# Patient Record
Sex: FEMALE | NOT HISPANIC OR LATINO
[De-identification: names, ages, dates, MRNs, and addresses within clinical notes are randomized per-mention and may not be internally consistent; named-entity substitution may affect disease eponyms.]

---

## 2021-09-16 ENCOUNTER — RESULT REVIEW (OUTPATIENT)
Age: 81
End: 2021-09-16

## 2021-12-21 ENCOUNTER — RESULT REVIEW (OUTPATIENT)
Age: 81
End: 2021-12-21

## 2022-01-10 ENCOUNTER — APPOINTMENT (OUTPATIENT)
Dept: GYNECOLOGIC ONCOLOGY | Facility: CLINIC | Age: 82
End: 2022-01-10
Payer: MEDICARE

## 2022-01-10 VITALS
BODY MASS INDEX: 43.61 KG/M2 | HEIGHT: 61 IN | DIASTOLIC BLOOD PRESSURE: 90 MMHG | SYSTOLIC BLOOD PRESSURE: 151 MMHG | HEART RATE: 76 BPM | WEIGHT: 231 LBS

## 2022-01-10 DIAGNOSIS — Z63.4 DISAPPEARANCE AND DEATH OF FAMILY MEMBER: ICD-10-CM

## 2022-01-10 DIAGNOSIS — Z78.9 OTHER SPECIFIED HEALTH STATUS: ICD-10-CM

## 2022-01-10 DIAGNOSIS — Z95.2 PRESENCE OF PROSTHETIC HEART VALVE: ICD-10-CM

## 2022-01-10 PROCEDURE — 99205 OFFICE O/P NEW HI 60 MIN: CPT

## 2022-01-10 RX ORDER — OMEPRAZOLE 40 MG/1
CAPSULE, DELAYED RELEASE ORAL
Refills: 0 | Status: ACTIVE | COMMUNITY

## 2022-01-10 RX ORDER — CHROMIUM 200 MCG
TABLET ORAL
Refills: 0 | Status: ACTIVE | COMMUNITY

## 2022-01-10 RX ORDER — ASPIRIN 81 MG
81 TABLET, DELAYED RELEASE (ENTERIC COATED) ORAL
Refills: 0 | Status: ACTIVE | COMMUNITY

## 2022-01-10 SDOH — SOCIAL STABILITY - SOCIAL INSECURITY: DISSAPEARANCE AND DEATH OF FAMILY MEMBER: Z63.4

## 2022-01-31 ENCOUNTER — OUTPATIENT (OUTPATIENT)
Dept: OUTPATIENT SERVICES | Facility: HOSPITAL | Age: 82
LOS: 1 days | End: 2022-01-31
Payer: MEDICARE

## 2022-01-31 VITALS
HEIGHT: 60.5 IN | WEIGHT: 231.93 LBS | TEMPERATURE: 98 F | HEART RATE: 80 BPM | OXYGEN SATURATION: 98 % | RESPIRATION RATE: 18 BRPM | DIASTOLIC BLOOD PRESSURE: 80 MMHG | SYSTOLIC BLOOD PRESSURE: 140 MMHG

## 2022-01-31 DIAGNOSIS — Z88.8 ALLERGY STATUS TO OTHER DRUGS, MEDICAMENTS AND BIOLOGICAL SUBSTANCES STATUS: ICD-10-CM

## 2022-01-31 DIAGNOSIS — Z91.040 LATEX ALLERGY STATUS: ICD-10-CM

## 2022-01-31 DIAGNOSIS — Z95.0 PRESENCE OF CARDIAC PACEMAKER: Chronic | ICD-10-CM

## 2022-01-31 DIAGNOSIS — C54.1 MALIGNANT NEOPLASM OF ENDOMETRIUM: ICD-10-CM

## 2022-01-31 DIAGNOSIS — Z95.0 PRESENCE OF CARDIAC PACEMAKER: ICD-10-CM

## 2022-01-31 DIAGNOSIS — Z95.818 PRESENCE OF OTHER CARDIAC IMPLANTS AND GRAFTS: Chronic | ICD-10-CM

## 2022-01-31 DIAGNOSIS — Z95.2 PRESENCE OF PROSTHETIC HEART VALVE: Chronic | ICD-10-CM

## 2022-01-31 DIAGNOSIS — Z90.49 ACQUIRED ABSENCE OF OTHER SPECIFIED PARTS OF DIGESTIVE TRACT: Chronic | ICD-10-CM

## 2022-01-31 LAB
A1C WITH ESTIMATED AVERAGE GLUCOSE RESULT: 8 % — HIGH (ref 4–5.6)
ALBUMIN SERPL ELPH-MCNC: 4.2 G/DL — SIGNIFICANT CHANGE UP (ref 3.3–5)
ALP SERPL-CCNC: 113 U/L — SIGNIFICANT CHANGE UP (ref 40–120)
ALT FLD-CCNC: 26 U/L — SIGNIFICANT CHANGE UP (ref 4–33)
ANION GAP SERPL CALC-SCNC: 12 MMOL/L — SIGNIFICANT CHANGE UP (ref 7–14)
AST SERPL-CCNC: 23 U/L — SIGNIFICANT CHANGE UP (ref 4–32)
BILIRUB SERPL-MCNC: 0.2 MG/DL — SIGNIFICANT CHANGE UP (ref 0.2–1.2)
BLD GP AB SCN SERPL QL: NEGATIVE — SIGNIFICANT CHANGE UP
BUN SERPL-MCNC: 20 MG/DL — SIGNIFICANT CHANGE UP (ref 7–23)
CALCIUM SERPL-MCNC: 10 MG/DL — SIGNIFICANT CHANGE UP (ref 8.4–10.5)
CHLORIDE SERPL-SCNC: 92 MMOL/L — LOW (ref 98–107)
CO2 SERPL-SCNC: 28 MMOL/L — SIGNIFICANT CHANGE UP (ref 22–31)
CREAT SERPL-MCNC: 0.6 MG/DL — SIGNIFICANT CHANGE UP (ref 0.5–1.3)
ESTIMATED AVERAGE GLUCOSE: 183 — SIGNIFICANT CHANGE UP
GLUCOSE SERPL-MCNC: 240 MG/DL — HIGH (ref 70–99)
HCT VFR BLD CALC: 45.2 % — HIGH (ref 34.5–45)
HGB BLD-MCNC: 14.9 G/DL — SIGNIFICANT CHANGE UP (ref 11.5–15.5)
MCHC RBC-ENTMCNC: 27.7 PG — SIGNIFICANT CHANGE UP (ref 27–34)
MCHC RBC-ENTMCNC: 33 GM/DL — SIGNIFICANT CHANGE UP (ref 32–36)
MCV RBC AUTO: 84.2 FL — SIGNIFICANT CHANGE UP (ref 80–100)
NRBC # BLD: 0 /100 WBCS — SIGNIFICANT CHANGE UP
NRBC # FLD: 0 K/UL — SIGNIFICANT CHANGE UP
PLATELET # BLD AUTO: 373 K/UL — SIGNIFICANT CHANGE UP (ref 150–400)
POTASSIUM SERPL-MCNC: 4.5 MMOL/L — SIGNIFICANT CHANGE UP (ref 3.5–5.3)
POTASSIUM SERPL-SCNC: 4.5 MMOL/L — SIGNIFICANT CHANGE UP (ref 3.5–5.3)
PROT SERPL-MCNC: 8 G/DL — SIGNIFICANT CHANGE UP (ref 6–8.3)
RBC # BLD: 5.37 M/UL — HIGH (ref 3.8–5.2)
RBC # FLD: 14 % — SIGNIFICANT CHANGE UP (ref 10.3–14.5)
RH IG SCN BLD-IMP: POSITIVE — SIGNIFICANT CHANGE UP
SODIUM SERPL-SCNC: 132 MMOL/L — LOW (ref 135–145)
WBC # BLD: 10.06 K/UL — SIGNIFICANT CHANGE UP (ref 3.8–10.5)
WBC # FLD AUTO: 10.06 K/UL — SIGNIFICANT CHANGE UP (ref 3.8–10.5)

## 2022-01-31 PROCEDURE — 93010 ELECTROCARDIOGRAM REPORT: CPT

## 2022-01-31 NOTE — H&P PST ADULT - NEGATIVE ENMT SYMPTOMS
no hearing difficulty/no ear pain/no tinnitus/no sinus symptoms/no nasal congestion/no nasal obstruction/no nose bleeds/no throat pain/no dysphagia

## 2022-01-31 NOTE — H&P PST ADULT - PROBLEM SELECTOR PLAN 1
Schedule for robotic assisted total laparoscopic hysterectomy bilateral salpingo oophorectomy sentinel lymph node mapping tentatively on 02/10/2022. Pre op instructions, famotidine, chlorhexidine gluconate soap given and explained.   Pt confirmed schedule appt for Covid test pre op

## 2022-01-31 NOTE — H&P PST ADULT - NSANTHOSAYNRD_GEN_A_CORE
No. EVRNA screening performed.  STOP BANG Legend: 0-2 = LOW Risk; 3-4 = INTERMEDIATE Risk; 5-8 = HIGH Risk

## 2022-01-31 NOTE — H&P PST ADULT - NEGATIVE GASTROINTESTINAL SYMPTOMS
----- Message from [address] [place] [address], HALEY - CNP sent at 10/26/2021  8:55 AM EDT -----  Results are excellent, continue all current medications.
Message left normal labs.
no vomiting/no diarrhea/no constipation/no change in bowel habits/no abdominal pain/no melena/no steatorrhea/no jaundice

## 2022-01-31 NOTE — H&P PST ADULT - BP NONINVASIVE MEAN (MM HG)
alcohol intox, no signs of trauma, +vomited prior to ED arrival, will order zofran IM, not hypoglycemia, will reassess.
100

## 2022-01-31 NOTE — H&P PST ADULT - NSICDXPASTMEDICALHX_GEN_ALL_CORE_FT
PAST MEDICAL HISTORY:  CAD (coronary atherosclerotic disease)     Congestive heart failure (CHF)     HTN (hypertension)     Obesity

## 2022-01-31 NOTE — H&P PST ADULT - NSICDXPASTSURGICALHX_GEN_ALL_CORE_FT
PAST SURGICAL HISTORY:  History of appendectomy at age 15    S/P TAVR (transcatheter aortic valve replacement) 04/2021    Status cardiac pacemaker 01/12/2022    Status post placement of implantable loop recorder removal  01/2022

## 2022-01-31 NOTE — H&P PST ADULT - NS MD HP INPLANTS MED DEV
Pacemaker/Heart valve Medtronic Pacemaker - dual chamber [id number], [id number]; placed 01/12/2022/Pacemaker/Heart valve

## 2022-01-31 NOTE — H&P PST ADULT - MUSCULOSKELETAL
details… no joint swelling/no joint warmth/no calf tenderness/decreased ROM due to pain detailed exam

## 2022-01-31 NOTE — H&P PST ADULT - HISTORY OF PRESENT ILLNESS
82 y/o female with H/O: CAD, HTN, ovarian cyst  c/o groin discomfort and vaginal itching 11/2021. Pt was eval by GYN, S/P hysterogram. S/P biopsy of endometrium showed "Stage 1 cancer". Pre op diagnosis: malignant neoplasm of endometrium. Now schedule for robotic assisted total laparoscopic hysterectomy, BSO    ***  + Covid infection 12/25/2021 *** 80 y/o female with H/O: CAD, S/P Medtronic PPM , S/P TAVR (04/2021), HTN, ovarian cyst presents to PST for per op evaluation with c/o groin discomfort and vaginal itching on 11/2021. Pt was eval by GYN, S/P hysterogram. S/P biopsy of endometrium showed "Stage 1 cancer". Pre op diagnosis: malignant neoplasm of endometrium. Now schedule for robotic assisted total laparoscopic hysterectomy, BSO, sentinel lymph node mapping     ***  + Covid infection 12/25/2021 ***

## 2022-01-31 NOTE — H&P PST ADULT - OTHER CARE PROVIDERS
Miguel Reinoso (Pulm) [phone]; Dr. Underwood, (Cardiologist) [phone]; Dr. Paul () [phone];  Dr. Roberts Miguel (Pul) [phone];

## 2022-01-31 NOTE — H&P PST ADULT - PROBLEM SELECTOR PROBLEM 1
PRE-OP EVALUATION    Patient Name: Terrell Richter    Pre-op Diagnosis: Gastroesophageal reflux disease, esophagitis presence not specified [K21.9]  Encounter for screening colonoscopy [Z12.11]    Procedure(s):  ESOPHAGOGASTRODUODENOSCOPY AND COLONOSCOP Evaluation    Patient summary reviewed.     Anesthetic Complications  (+) history of anesthetic complications  History of: PONV       GI/Hepatic/Renal      (+) GERD                           Cardiovascular                (+) obesity  (+) hypertension findings            ASA: 3   Plan: MAC  NPO status verified and patient meets guidelines. Post-procedure pain management plan discussed with surgeon and patient.     Comment: I explained the intrinsic risks of MAC anesthesia to [address] Malignant neoplasm of endometrium

## 2022-01-31 NOTE — H&P PST ADULT - NEGATIVE OPHTHALMOLOGIC SYMPTOMS
no lacrimation L/no lacrimation R/no blurred vision L/no blurred vision R/no pain L/no pain R/no loss of vision L/no loss of vision R

## 2022-02-03 ENCOUNTER — NON-APPOINTMENT (OUTPATIENT)
Age: 82
End: 2022-02-03

## 2022-02-09 ENCOUNTER — TRANSCRIPTION ENCOUNTER (OUTPATIENT)
Age: 82
End: 2022-02-09

## 2022-02-09 NOTE — ASU PATIENT PROFILE, ADULT - FALL HARM RISK - UNIVERSAL INTERVENTIONS
Bed in lowest position, wheels locked, appropriate side rails in place/Call bell, personal items and telephone in reach/Instruct patient to call for assistance before getting out of bed or chair/Non-slip footwear when patient is out of bed/[place] to call system/Physically safe environment - no spills, clutter or unnecessary equipment/Purposeful Proactive Rounding/Room/bathroom lighting operational, light cord in reach

## 2022-02-10 ENCOUNTER — RESULT REVIEW (OUTPATIENT)
Age: 82
End: 2022-02-10

## 2022-02-10 ENCOUNTER — APPOINTMENT (OUTPATIENT)
Dept: GYNECOLOGIC ONCOLOGY | Facility: HOSPITAL | Age: 82
End: 2022-02-10

## 2022-02-10 ENCOUNTER — INPATIENT (INPATIENT)
Facility: HOSPITAL | Age: 82
LOS: 0 days | Discharge: HOME HEALTH | End: 2022-02-11
Attending: OBSTETRICS & GYNECOLOGY | Admitting: OBSTETRICS & GYNECOLOGY
Payer: MEDICARE

## 2022-02-10 VITALS
RESPIRATION RATE: 16 BRPM | TEMPERATURE: 98 F | DIASTOLIC BLOOD PRESSURE: 79 MMHG | HEIGHT: 60.5 IN | WEIGHT: 231.93 LBS | HEART RATE: 76 BPM | SYSTOLIC BLOOD PRESSURE: 171 MMHG | OXYGEN SATURATION: 95 %

## 2022-02-10 DIAGNOSIS — C54.1 MALIGNANT NEOPLASM OF ENDOMETRIUM: ICD-10-CM

## 2022-02-10 DIAGNOSIS — Z90.49 ACQUIRED ABSENCE OF OTHER SPECIFIED PARTS OF DIGESTIVE TRACT: Chronic | ICD-10-CM

## 2022-02-10 DIAGNOSIS — Z95.0 PRESENCE OF CARDIAC PACEMAKER: Chronic | ICD-10-CM

## 2022-02-10 DIAGNOSIS — Z95.818 PRESENCE OF OTHER CARDIAC IMPLANTS AND GRAFTS: Chronic | ICD-10-CM

## 2022-02-10 DIAGNOSIS — Z95.2 PRESENCE OF PROSTHETIC HEART VALVE: Chronic | ICD-10-CM

## 2022-02-10 LAB
ANION GAP SERPL CALC-SCNC: 12 MMOL/L — SIGNIFICANT CHANGE UP (ref 7–14)
BASOPHILS # BLD AUTO: 0.06 K/UL — SIGNIFICANT CHANGE UP (ref 0–0.2)
BASOPHILS NFR BLD AUTO: 0.5 % — SIGNIFICANT CHANGE UP (ref 0–2)
BUN SERPL-MCNC: 12 MG/DL — SIGNIFICANT CHANGE UP (ref 7–23)
CALCIUM SERPL-MCNC: 9 MG/DL — SIGNIFICANT CHANGE UP (ref 8.4–10.5)
CHLORIDE SERPL-SCNC: 101 MMOL/L — SIGNIFICANT CHANGE UP (ref 98–107)
CO2 SERPL-SCNC: 24 MMOL/L — SIGNIFICANT CHANGE UP (ref 22–31)
CREAT SERPL-MCNC: 0.57 MG/DL — SIGNIFICANT CHANGE UP (ref 0.5–1.3)
EOSINOPHIL # BLD AUTO: 0.04 K/UL — SIGNIFICANT CHANGE UP (ref 0–0.5)
EOSINOPHIL NFR BLD AUTO: 0.4 % — SIGNIFICANT CHANGE UP (ref 0–6)
GLUCOSE BLDC GLUCOMTR-MCNC: 180 MG/DL — HIGH (ref 70–99)
GLUCOSE SERPL-MCNC: 185 MG/DL — HIGH (ref 70–99)
HCT VFR BLD CALC: 38.5 % — SIGNIFICANT CHANGE UP (ref 34.5–45)
HGB BLD-MCNC: 12.7 G/DL — SIGNIFICANT CHANGE UP (ref 11.5–15.5)
IANC: 8.98 K/UL — HIGH (ref 1.5–8.5)
IMM GRANULOCYTES NFR BLD AUTO: 1 % — SIGNIFICANT CHANGE UP (ref 0–1.5)
LYMPHOCYTES # BLD AUTO: 1.74 K/UL — SIGNIFICANT CHANGE UP (ref 1–3.3)
LYMPHOCYTES # BLD AUTO: 15.5 % — SIGNIFICANT CHANGE UP (ref 13–44)
MAGNESIUM SERPL-MCNC: 1.8 MG/DL — SIGNIFICANT CHANGE UP (ref 1.6–2.6)
MCHC RBC-ENTMCNC: 27.7 PG — SIGNIFICANT CHANGE UP (ref 27–34)
MCHC RBC-ENTMCNC: 33 GM/DL — SIGNIFICANT CHANGE UP (ref 32–36)
MCV RBC AUTO: 84.1 FL — SIGNIFICANT CHANGE UP (ref 80–100)
MONOCYTES # BLD AUTO: 0.26 K/UL — SIGNIFICANT CHANGE UP (ref 0–0.9)
MONOCYTES NFR BLD AUTO: 2.3 % — SIGNIFICANT CHANGE UP (ref 2–14)
NEUTROPHILS # BLD AUTO: 8.98 K/UL — HIGH (ref 1.8–7.4)
NEUTROPHILS NFR BLD AUTO: 80.3 % — HIGH (ref 43–77)
NRBC # BLD: 0 /100 WBCS — SIGNIFICANT CHANGE UP
NRBC # FLD: 0 K/UL — SIGNIFICANT CHANGE UP
PHOSPHATE SERPL-MCNC: 4 MG/DL — SIGNIFICANT CHANGE UP (ref 2.5–4.5)
PLATELET # BLD AUTO: 262 K/UL — SIGNIFICANT CHANGE UP (ref 150–400)
POTASSIUM SERPL-MCNC: 4.1 MMOL/L — SIGNIFICANT CHANGE UP (ref 3.5–5.3)
POTASSIUM SERPL-SCNC: 4.1 MMOL/L — SIGNIFICANT CHANGE UP (ref 3.5–5.3)
RBC # BLD: 4.58 M/UL — SIGNIFICANT CHANGE UP (ref 3.8–5.2)
RBC # FLD: 14.1 % — SIGNIFICANT CHANGE UP (ref 10.3–14.5)
SODIUM SERPL-SCNC: 137 MMOL/L — SIGNIFICANT CHANGE UP (ref 135–145)
WBC # BLD: 11.19 K/UL — HIGH (ref 3.8–10.5)
WBC # FLD AUTO: 11.19 K/UL — HIGH (ref 3.8–10.5)

## 2022-02-10 PROCEDURE — 88307 TISSUE EXAM BY PATHOLOGIST: CPT | Mod: 26

## 2022-02-10 PROCEDURE — 38900 IO MAP OF SENT LYMPH NODE: CPT | Mod: 50

## 2022-02-10 PROCEDURE — 58571 TLH W/T/O 250 G OR LESS: CPT

## 2022-02-10 PROCEDURE — S2900 ROBOTIC SURGICAL SYSTEM: CPT | Mod: NC

## 2022-02-10 PROCEDURE — 88342 IMHCHEM/IMCYTCHM 1ST ANTB: CPT | Mod: 26,59

## 2022-02-10 PROCEDURE — 88309 TISSUE EXAM BY PATHOLOGIST: CPT | Mod: 26

## 2022-02-10 PROCEDURE — 88112 CYTOPATH CELL ENHANCE TECH: CPT | Mod: 26

## 2022-02-10 PROCEDURE — 88341 IMHCHEM/IMCYTCHM EA ADD ANTB: CPT | Mod: 26,59

## 2022-02-10 PROCEDURE — 38570 LAPAROSCOPY LYMPH NODE BIOP: CPT

## 2022-02-10 PROCEDURE — 88360 TUMOR IMMUNOHISTOCHEM/MANUAL: CPT | Mod: 26

## 2022-02-10 RX ORDER — ACETAMINOPHEN 500 MG
1000 TABLET ORAL ONCE
Refills: 0 | Status: COMPLETED | OUTPATIENT
Start: 2022-02-10 | End: 2022-02-10

## 2022-02-10 RX ORDER — HYDROMORPHONE HYDROCHLORIDE 2 MG/ML
0.5 INJECTION INTRAMUSCULAR; INTRAVENOUS; SUBCUTANEOUS
Refills: 0 | Status: DISCONTINUED | OUTPATIENT
Start: 2022-02-10 | End: 2022-02-10

## 2022-02-10 RX ORDER — ACETAMINOPHEN 500 MG
1000 TABLET ORAL EVERY 6 HOURS
Refills: 0 | Status: DISCONTINUED | OUTPATIENT
Start: 2022-02-10 | End: 2022-02-11

## 2022-02-10 RX ORDER — SODIUM CHLORIDE 9 MG/ML
1000 INJECTION, SOLUTION INTRAVENOUS
Refills: 0 | Status: DISCONTINUED | OUTPATIENT
Start: 2022-02-10 | End: 2022-02-10

## 2022-02-10 RX ORDER — SENNA PLUS 8.6 MG/1
1 TABLET ORAL AT BEDTIME
Refills: 0 | Status: DISCONTINUED | OUTPATIENT
Start: 2022-02-10 | End: 2022-02-11

## 2022-02-10 RX ORDER — ONDANSETRON 8 MG/1
8 TABLET, FILM COATED ORAL EVERY 8 HOURS
Refills: 0 | Status: DISCONTINUED | OUTPATIENT
Start: 2022-02-10 | End: 2022-02-11

## 2022-02-10 RX ORDER — HYDROMORPHONE HYDROCHLORIDE 2 MG/ML
1 INJECTION INTRAMUSCULAR; INTRAVENOUS; SUBCUTANEOUS
Refills: 0 | Status: DISCONTINUED | OUTPATIENT
Start: 2022-02-10 | End: 2022-02-10

## 2022-02-10 RX ORDER — IBUPROFEN 200 MG
600 TABLET ORAL EVERY 6 HOURS
Refills: 0 | Status: DISCONTINUED | OUTPATIENT
Start: 2022-02-10 | End: 2022-02-11

## 2022-02-10 RX ORDER — ONDANSETRON 8 MG/1
4 TABLET, FILM COATED ORAL ONCE
Refills: 0 | Status: DISCONTINUED | OUTPATIENT
Start: 2022-02-10 | End: 2022-02-10

## 2022-02-10 RX ORDER — KETOROLAC TROMETHAMINE 30 MG/ML
15 SYRINGE (ML) INJECTION ONCE
Refills: 0 | Status: DISCONTINUED | OUTPATIENT
Start: 2022-02-10 | End: 2022-02-10

## 2022-02-10 RX ORDER — SIMETHICONE 80 MG/1
80 TABLET, CHEWABLE ORAL EVERY 6 HOURS
Refills: 0 | Status: DISCONTINUED | OUTPATIENT
Start: 2022-02-10 | End: 2022-02-11

## 2022-02-10 RX ORDER — OXYCODONE HYDROCHLORIDE 5 MG/1
2.5 TABLET ORAL
Refills: 0 | Status: DISCONTINUED | OUTPATIENT
Start: 2022-02-10 | End: 2022-02-11

## 2022-02-10 RX ORDER — SODIUM CHLORIDE 9 MG/ML
3 INJECTION INTRAMUSCULAR; INTRAVENOUS; SUBCUTANEOUS EVERY 8 HOURS
Refills: 0 | Status: DISCONTINUED | OUTPATIENT
Start: 2022-02-10 | End: 2022-02-11

## 2022-02-10 RX ORDER — METOPROLOL TARTRATE 50 MG
5 TABLET ORAL EVERY 6 HOURS
Refills: 0 | Status: DISCONTINUED | OUTPATIENT
Start: 2022-02-10 | End: 2022-02-11

## 2022-02-10 RX ORDER — HEPARIN SODIUM 5000 [USP'U]/ML
7500 INJECTION INTRAVENOUS; SUBCUTANEOUS EVERY 8 HOURS
Refills: 0 | Status: DISCONTINUED | OUTPATIENT
Start: 2022-02-10 | End: 2022-02-11

## 2022-02-10 RX ADMIN — HYDROMORPHONE HYDROCHLORIDE 0.5 MILLIGRAM(S): 2 INJECTION INTRAMUSCULAR; INTRAVENOUS; SUBCUTANEOUS at 18:22

## 2022-02-10 RX ADMIN — Medication 15 MILLIGRAM(S): at 22:59

## 2022-02-10 RX ADMIN — SODIUM CHLORIDE 30 MILLILITER(S): 9 INJECTION, SOLUTION INTRAVENOUS at 11:10

## 2022-02-10 RX ADMIN — HYDROMORPHONE HYDROCHLORIDE 0.5 MILLIGRAM(S): 2 INJECTION INTRAMUSCULAR; INTRAVENOUS; SUBCUTANEOUS at 18:45

## 2022-02-10 RX ADMIN — Medication 400 MILLIGRAM(S): at 16:57

## 2022-02-10 RX ADMIN — HEPARIN SODIUM 7500 UNIT(S): 5000 INJECTION INTRAVENOUS; SUBCUTANEOUS at 22:59

## 2022-02-10 RX ADMIN — Medication 1000 MILLIGRAM(S): at 17:10

## 2022-02-10 RX ADMIN — Medication 1000 MILLIGRAM(S): at 22:59

## 2022-02-10 RX ADMIN — SODIUM CHLORIDE 3 MILLILITER(S): 9 INJECTION INTRAMUSCULAR; INTRAVENOUS; SUBCUTANEOUS at 21:29

## 2022-02-10 NOTE — BRIEF OPERATIVE NOTE - NSICDXBRIEFPROCEDURE_GEN_ALL_CORE_FT
PROCEDURES:  Robot-assisted total hysterectomy for uterus less than 250 grams 10-Feb-2022 19:07:14  Timothy Ambrocio  Robot-assisted salpingo-oophorectomy 10-Feb-2022 19:07:36  Timothy Ambrocio  Lymphadenectomy, sentinel, pelvic, laparoscopic, with ICG fluorescence mapping 10-Feb-2022 19:08:18  Timothy Ambrocio

## 2022-02-10 NOTE — BRIEF OPERATIVE NOTE - OPERATION/FINDINGS
Grossly normal external genitalia, 8cm sized mobile uterus, adnexa wnl b/l  LSC: hemostatic entry, grossly normal abdominal survey including liver edge, omentum, and small bowel  Pelvic survey: grossly normal uterus, fallopian tubes and ovaries b/l

## 2022-02-10 NOTE — BRIEF OPERATIVE NOTE - IV INFUSIONS - CRYSTALLOIDS
1100 Muscle Hinge Flap Text: The defect edges were debeveled with a #15c scalpel blade.  Given the size, depth and location of the defect and the proximity to free margins a muscle hinge flap was deemed most appropriate.  Using a sterile surgical marker, an appropriate hinge flap was drawn incorporating the defect. The area thus outlined was incised with a #15 scalpel blade.  The skin margins were undermined to an appropriate distance in all directions utilizing iris scissors.

## 2022-02-10 NOTE — PATIENT PROFILE ADULT - TRANSPORTATION
Pt states that his pharmacy is requesting all of his  Prescriptions by Dr Suazo Marycruz be printed.  Please advise when ready for 
Spoke with patient (2 verifiers name/) regarding RX will be ready for  today. Patient stated he would like simvastatin instead of crestor due to cost.  Routing to Dr Carlotta Campbell.
Spoke with patient (2 verifiers name/) regarding needing prescriptions printed to send to the [facility] mail order pharmacy. Patient informed that per Dr Elton Ruiz diabetic medications and the BP medication will have to come from the specialist's office. Patient asked if Dr Elton Ruiz can print the scripts she can and he will come by the office today to pick them up. Patient stated he does not know which [facility] Dr Elton Ruiz can print but would like for Dr Elton Ruiz to take a look at his medications and print the ones she feels comfortable printing. Patient acknowledge he will need to contact his specialists for other prescriptions. Patient informed Dr Elton Ruiz will be given the message. Patient voiced understanding.
no

## 2022-02-10 NOTE — PATIENT PROFILE ADULT - FALL HARM RISK - HARM RISK INTERVENTIONS

## 2022-02-10 NOTE — BRIEF OPERATIVE NOTE - SPECIMENS
uterus, left fallopian tube, right fallopian tube, right ovary, left ovary, left and right sentinal lymph nodes

## 2022-02-10 NOTE — CHART NOTE - NSCHARTNOTEFT_GEN_A_CORE
PA GynOnc Post Op Note    Pt seen and examined at bedside in PACU resting comfortably.  Pt denies fever, chills, chest pain, SOB, nausea, vomiting, lightheadedness, dizziness.  Vegas catheter in place.      T(C): 35.5 (02-10-22 @ 16:05), Max: 36.6 (02-10-22 @ 11:13)  HR: 65 (02-10-22 @ 17:15) (60 - 76)  BP: 155/55 (02-10-22 @ 16:45) (129/65 - 171/79)  RR: 17 (02-10-22 @ 17:15) (16 - 25)  SpO2: 95% (02-10-22 @ 17:15) (95% - 100%)  Wt(kg): --  I&O's Detail    10 Feb 2022 07:01  -  10 Feb 2022 17:39  --------------------------------------------------------  IN:    Lactated Ringers: 30 mL  Total IN: 30 mL    OUT:    Indwelling Catheter - Urethral (mL): 150 mL  Total OUT: 150 mL    Total NET: -120 mL    Physical Exam:  Constitutional: WDWN female, NAD AxOx3  Skin: warm and dry, no breakdowns noted  Chest: s1s2+, RRR, clear to auscultation bilaterally, no w/r/r    Abdomen: soft, nondistended, no guarding, no rebound, + bowel sounds,  Appropriate tenderness noted   Incisional site:    scope sites all clean and dry with outer dressings intact.   Extremities: no lower extremity edema or calf tenderness bilaterally    a/p: This 81y female, s/p Robotic Assisted TLH, BSO, SLNMD for known FIGO grade 1 endometrial adenocarcinoma,  EBL: 50cc, pt stable    CV: hemodynamically stable, Pt has PPM, will admit to telemetry overnight. Pt also with h/o HTN and CHF. Lopressor ordered prn and IVF 75cc/hr. Strict I&Os ordered.   PUL: on 2LNC saturation is adequate presently. Pt has h/o VERNA, ordered for CPAP overnight  GI: NPO presently, ordered for regular diet  : Vegas in place with clear yellow urine noted in the bag, Vegas to remain in overnight.  ID: afebrile, WBC stable, labs ordered for am  DVT prophylaxis: SQ Heparin ordered  Pain Management: controlled presently  continue IV Fluids LR@75cc/hr  d/w gyn onc team    Mery Puente, PAC  #[id number] spectra

## 2022-02-11 ENCOUNTER — TRANSCRIPTION ENCOUNTER (OUTPATIENT)
Age: 82
End: 2022-02-11

## 2022-02-11 VITALS
OXYGEN SATURATION: 96 % | DIASTOLIC BLOOD PRESSURE: 48 MMHG | SYSTOLIC BLOOD PRESSURE: 128 MMHG | RESPIRATION RATE: 20 BRPM | HEART RATE: 62 BPM | TEMPERATURE: 97 F

## 2022-02-11 DIAGNOSIS — C54.1 MALIGNANT NEOPLASM OF ENDOMETRIUM: ICD-10-CM

## 2022-02-11 PROBLEM — I50.9 HEART FAILURE, UNSPECIFIED: Chronic | Status: ACTIVE | Noted: 2022-01-31

## 2022-02-11 PROBLEM — I10 ESSENTIAL (PRIMARY) HYPERTENSION: Chronic | Status: ACTIVE | Noted: 2022-01-31

## 2022-02-11 PROBLEM — E66.9 OBESITY, UNSPECIFIED: Chronic | Status: ACTIVE | Noted: 2022-01-31

## 2022-02-11 PROBLEM — I25.10 ATHEROSCLEROTIC HEART DISEASE OF NATIVE CORONARY ARTERY WITHOUT ANGINA PECTORIS: Chronic | Status: ACTIVE | Noted: 2022-01-31

## 2022-02-11 LAB
ANION GAP SERPL CALC-SCNC: 12 MMOL/L — SIGNIFICANT CHANGE UP (ref 7–14)
BASOPHILS # BLD AUTO: 0.01 K/UL — SIGNIFICANT CHANGE UP (ref 0–0.2)
BASOPHILS NFR BLD AUTO: 0.1 % — SIGNIFICANT CHANGE UP (ref 0–2)
BUN SERPL-MCNC: 14 MG/DL — SIGNIFICANT CHANGE UP (ref 7–23)
CALCIUM SERPL-MCNC: 9.1 MG/DL — SIGNIFICANT CHANGE UP (ref 8.4–10.5)
CHLORIDE SERPL-SCNC: 96 MMOL/L — LOW (ref 98–107)
CO2 SERPL-SCNC: 23 MMOL/L — SIGNIFICANT CHANGE UP (ref 22–31)
CREAT SERPL-MCNC: 0.53 MG/DL — SIGNIFICANT CHANGE UP (ref 0.5–1.3)
EOSINOPHIL # BLD AUTO: 0 K/UL — SIGNIFICANT CHANGE UP (ref 0–0.5)
EOSINOPHIL NFR BLD AUTO: 0 % — SIGNIFICANT CHANGE UP (ref 0–6)
GLUCOSE SERPL-MCNC: 217 MG/DL — HIGH (ref 70–99)
HCT VFR BLD CALC: 38.8 % — SIGNIFICANT CHANGE UP (ref 34.5–45)
HGB BLD-MCNC: 12.8 G/DL — SIGNIFICANT CHANGE UP (ref 11.5–15.5)
IANC: 9.54 K/UL — HIGH (ref 1.5–8.5)
IMM GRANULOCYTES NFR BLD AUTO: 0.6 % — SIGNIFICANT CHANGE UP (ref 0–1.5)
LYMPHOCYTES # BLD AUTO: 1.39 K/UL — SIGNIFICANT CHANGE UP (ref 1–3.3)
LYMPHOCYTES # BLD AUTO: 12.3 % — LOW (ref 13–44)
MAGNESIUM SERPL-MCNC: 1.9 MG/DL — SIGNIFICANT CHANGE UP (ref 1.6–2.6)
MCHC RBC-ENTMCNC: 27.9 PG — SIGNIFICANT CHANGE UP (ref 27–34)
MCHC RBC-ENTMCNC: 33 GM/DL — SIGNIFICANT CHANGE UP (ref 32–36)
MCV RBC AUTO: 84.7 FL — SIGNIFICANT CHANGE UP (ref 80–100)
MONOCYTES # BLD AUTO: 0.29 K/UL — SIGNIFICANT CHANGE UP (ref 0–0.9)
MONOCYTES NFR BLD AUTO: 2.6 % — SIGNIFICANT CHANGE UP (ref 2–14)
NEUTROPHILS # BLD AUTO: 9.54 K/UL — HIGH (ref 1.8–7.4)
NEUTROPHILS NFR BLD AUTO: 84.4 % — HIGH (ref 43–77)
NRBC # BLD: 0 /100 WBCS — SIGNIFICANT CHANGE UP
NRBC # FLD: 0 K/UL — SIGNIFICANT CHANGE UP
PHOSPHATE SERPL-MCNC: 3.7 MG/DL — SIGNIFICANT CHANGE UP (ref 2.5–4.5)
PLATELET # BLD AUTO: 265 K/UL — SIGNIFICANT CHANGE UP (ref 150–400)
POTASSIUM SERPL-MCNC: 4.3 MMOL/L — SIGNIFICANT CHANGE UP (ref 3.5–5.3)
POTASSIUM SERPL-SCNC: 4.3 MMOL/L — SIGNIFICANT CHANGE UP (ref 3.5–5.3)
RBC # BLD: 4.58 M/UL — SIGNIFICANT CHANGE UP (ref 3.8–5.2)
RBC # FLD: 14.2 % — SIGNIFICANT CHANGE UP (ref 10.3–14.5)
SODIUM SERPL-SCNC: 131 MMOL/L — LOW (ref 135–145)
WBC # BLD: 11.3 K/UL — HIGH (ref 3.8–10.5)
WBC # FLD AUTO: 11.3 K/UL — HIGH (ref 3.8–10.5)

## 2022-02-11 RX ORDER — SIMETHICONE 80 MG/1
1 TABLET, CHEWABLE ORAL
Qty: 0 | Refills: 0 | DISCHARGE
Start: 2022-02-11

## 2022-02-11 RX ORDER — SENNA PLUS 8.6 MG/1
1 TABLET ORAL
Qty: 0 | Refills: 0 | DISCHARGE
Start: 2022-02-11

## 2022-02-11 RX ORDER — ACETAMINOPHEN 500 MG
2 TABLET ORAL
Qty: 0 | Refills: 0 | DISCHARGE
Start: 2022-02-11

## 2022-02-11 RX ORDER — OXYCODONE HYDROCHLORIDE 5 MG/1
0.5 TABLET ORAL
Qty: 6 | Refills: 0
Start: 2022-02-11 | End: 2022-02-13

## 2022-02-11 RX ORDER — IBUPROFEN 200 MG
1 TABLET ORAL
Qty: 0 | Refills: 0 | DISCHARGE
Start: 2022-02-11

## 2022-02-11 RX ADMIN — Medication 1000 MILLIGRAM(S): at 09:30

## 2022-02-11 RX ADMIN — HEPARIN SODIUM 7500 UNIT(S): 5000 INJECTION INTRAVENOUS; SUBCUTANEOUS at 05:49

## 2022-02-11 RX ADMIN — Medication 1000 MILLIGRAM(S): at 10:00

## 2022-02-11 RX ADMIN — Medication 1000 MILLIGRAM(S): at 05:49

## 2022-02-11 RX ADMIN — SODIUM CHLORIDE 3 MILLILITER(S): 9 INJECTION INTRAMUSCULAR; INTRAVENOUS; SUBCUTANEOUS at 05:56

## 2022-02-11 NOTE — DISCHARGE NOTE PROVIDER - CARE PROVIDER_API CALL
Alexa Reis)  Gynecologic Oncology; Obstetrics and Gynecology  [address] [place] [address]  Phone: [phone]  Fax: [phone]  Established Patient  Scheduled Appointment: 02/25/2022 10:30 AM

## 2022-02-11 NOTE — PROGRESS NOTE ADULT - ASSESSMENT
A/P: 81y POD#1 s/p Robotic Assisted TLH, BSO, SLNMD for known FIGO grade 1 endometrial adenocarcinoma,  EBL: 50cc .  Patient is stable and doing well postoperatively.      Neuro: pain well controlled on current regimen.   CV: Hemodynamically stable. f/u H/H on AM labs.   -Hx  of HTN: lopressor PRN. restart home meds this AM  -Hx of PPM: on Tele; no events O/N.   Pulm: Saturating well on room air, encourage oob/amb  -Hx of VERNA: CPAP O/N  GI:  Continue regular diet  : Voiding spontaneously   Heme: HSQ and SCDs for DVT ppx  FEN: SLIV.  replete electrolytes prn   ID: Afebrile  Endo: No active issues   Dispo: Discharge planning      Cristóbal PGY2 A/P: 81y POD#1 s/p Robotic Assisted TLH, BSO, SLNMD for known FIGO grade 1 endometrial adenocarcinoma,  EBL: 50cc .  Patient is stable and doing well postoperatively.      Neuro: pain well controlled on current regimen.   CV: Hemodynamically stable. f/u H/H on AM labs.   -Hx  of HTN: lopressor PRN. restart home meds this AM  -Hx of cardiac pacemaker: on Tele; no events O/N.   Pulm: Saturating well on room air, encourage oob/amb  -Hx of VERNA: CPAP O/N  GI:  Continue regular diet  : Voiding spontaneously   Heme: HSQ and SCDs for DVT ppx  FEN: SLIV.  replete electrolytes prn   ID: Afebrile  Endo: No active issues   Dispo: Discharge planning      Cristóbal PGY2

## 2022-02-11 NOTE — DISCHARGE NOTE PROVIDER - NSDCCPCAREPLAN_GEN_ALL_CORE_FT
PRINCIPAL DISCHARGE DIAGNOSIS  Diagnosis: Endometrial cancer  Assessment and Plan of Treatment: s/p RA TLH, BSO, SLNMD  post operative state

## 2022-02-11 NOTE — DISCHARGE NOTE NURSING/CASE MANAGEMENT/SOCIAL WORK - NSDCPNINST_GEN_ALL_CORE
Keep surgical incisions clean and dry. For any signs of infection such as fever over 101F, new pain that cannot be controlled with ordered medication, unusual discharge, and or chills, please call your primary care provider or visit the emergency room.

## 2022-02-11 NOTE — DISCHARGE NOTE NURSING/CASE MANAGEMENT/SOCIAL WORK - NSDCPEFALRISK_GEN_ALL_CORE
For information on Fall & Injury Prevention, visit: [email or web address] OR  [email or web address] OR  https://www.cdc.gov/steadi/patient.html

## 2022-02-11 NOTE — DISCHARGE NOTE PROVIDER - NSDCMRMEDTOKEN_GEN_ALL_CORE_FT
acetaminophen 500 mg oral tablet: 2 tab(s) orally every 6 hours, As Needed  aspirin 81 mg oral tablet: orally once a day  furosemide 40 mg oral tablet: 1 tab(s) orally once a day Am  ibuprofen 600 mg oral tablet: 1 tab(s) orally every 6 hours, As Needed  metoprolol succinate 25 mg oral tablet, extended release: 1 tab(s) orally once a day Am  omeprazole 40 mg oral delayed release capsule: 1 cap(s) orally once a day, As Needed  oxyCODONE 5 mg oral tablet: 0.5 tab(s) orally every 6 hours, As Needed MDD:10mg  senna oral tablet: 1 tab(s) orally once a day (at bedtime), As needed, Constipation  simethicone 80 mg oral tablet, chewable: 1 tab(s) orally every 6 hours, As needed, Gas  spironolactone 25 mg oral tablet: 1 tab(s) orally once a day noon  Vitamin D3 25 mcg (1000 intl units) oral tablet: 1 tab(s) orally once a day 12 noon

## 2022-02-11 NOTE — DISCHARGE NOTE PROVIDER - HOSPITAL COURSE
this 81 year old female s/p Robotic Assisted TLH BSO, SLNMD for known FIGO grade 1 endometrial adenocarcinoma, EBL: 50cc , (see operative note for details of procedure). Pt was extubated in the OR and transferred to PACU in a hemodynamically stable condition and SQ Heparin for DVT prophylaxis. Pt has a long h/o cardiac/pulm issues including VERNA and uses a CPAP at home. For these reasons, it was decided for pt to stay overnight for observation on telemetry and pulse oximetry and better post operative care. Her Vegas catheter was discontinued and she was able to void spontaneously. On POD1, pt was out of bed to chair.  Pt's pain was controlled on PO meds.  Pt vital signs remained stable throughout post-operative course.  Pt tolerated reg diet. Upon discharge 2/11/2022, case was discussed with Dr. Singh that the patient is medically cleared and optimized for discharge today. The patient is ambulating and voiding spontaneously, tolerating oral intake, pain was well controlled with oral medication, and vital signs were stable. All home care has been explained to pt and family.  Pt understands home instructions and all questions have been answered regarding home care and discharge.  Medications sent to pharmacy of pt choice.    LABS:             12.8   11.30 )-----------( 265      ( 02-11 @ 07:11 )             38.8                12.7   11.19 )-----------( 262      ( 02-10 @ 17:51 )             38.5     Vital Signs Last 24 Hrs  T(C): 36.5 (11 Feb 2022 08:57), Max: 37.6 (10 Feb 2022 20:00)  T(F): 97.7 (11 Feb 2022 08:57), Max: 99.7 (10 Feb 2022 20:00)  HR: 62 (11 Feb 2022 08:57) (60 - 70)  BP: 132/56 (11 Feb 2022 08:57) (109/47 - 155/55)  BP(mean): 61 (10 Feb 2022 21:00) (61 - 89)  RR: 18 (11 Feb 2022 08:57) (13 - 25)  SpO2: 97% (11 Feb 2022 08:57) (95% - 100%)

## 2022-02-11 NOTE — PROGRESS NOTE ADULT - PROBLEM SELECTOR PLAN 1
GYN ONC Fellow Addendum:    Pt seen and examined at bedside. Agree with above. Pain controlled. Tolerating reg diet, -n/v. +flatus. ambulating and voiding.    VS reviewed  Labs Pending    - Continue current pain regimen  - VERNA: O2 sats wnl, CPAP overnight  - CAD, CHF: HR wnl, on telle, no events overnight, continue home meds  - Encourage ambulation and IS use  - Replete lytes prn  - DVT ppx  - OOB  - Dispo: d/c home today    STANLEY Plascencia, PGY6

## 2022-02-11 NOTE — PROGRESS NOTE ADULT - ATTENDING COMMENTS
Patient seen and examined at bedside, agree with above note, including assessment and plan. Abdomen benign. Discussed today's plan of care with patient and son, all questions answered. Continue routine postop care, d/c today.

## 2022-02-11 NOTE — DISCHARGE NOTE PROVIDER - NSDCQMCOGNITION_NEU_ALL_CORE
ED Time Seen By Provider Entered On:  2/11/2019 11:21     Performed On:  2/11/2019 11:21  by Daniel Lovelace MD               Time Seen By Provider   Time Seen by Provider :   2/11/2019 11:21    Daniel Lovelace MD - 2/11/2019 11:21                Electronically Signed On 02.11.2019 11:21  ___________________________________________________   Daniel Lovelace MD     No difficulties

## 2022-02-11 NOTE — DISCHARGE NOTE PROVIDER - NSDCFUADDINST_GEN_ALL_CORE_FT
1. Return to your regular way of eating. Resume normal activity as tolerated, however No heavy lifting or strenuous activity for 6 weeks.  No driving for next 2 weeks and/or while on narcotic pain medication.  Complete vaginal rest, no tampons, no douching, no tub bathing, no sexual activities for 6 weeks unless otherwise instructed by your doctor. Call your doctor with any signs and symptoms of infection such as fever, chills, nausea or vomiting.  Call your doctor with redness or swelling at the incision site, fluid leakage or wound separation.  Call your doctor if you're unable to tolerate food or have difficulty urinating.  Call your doctor if you have pain that is not relieved by your prescribed medications.  Notify your doctor with any other concerns.  2. Please take Ibuprofen for moderate pain management. 600mg every 6 hours  3. Please take Tylenol for mild pain management 650mg every 6 hours  4. Please take oxycodone (ONE HALF TABLET) every 6 hours for severe pain.

## 2022-02-11 NOTE — DISCHARGE NOTE NURSING/CASE MANAGEMENT/SOCIAL WORK - NSDPDISTO_GEN_ALL_CORE
Patient is AXOX4. Denied chest pain and shortness of breath. Vital signs remained stable. Pain was assessed and remained at an acceptable level with interventions. Incentive spirometer encouraged. Patient ambulated in hallway. Patient safety maintained through out shift. Surgical incision is d/c/i. IV's are being removed and patient is being discharged./Home

## 2022-02-11 NOTE — DISCHARGE NOTE NURSING/CASE MANAGEMENT/SOCIAL WORK - PATIENT PORTAL LINK FT
You can access the FollowMyHealth Patient Portal offered by [facility] by registering at the following website: [email or web address]. By joining [employer]’s FollowMyHealth portal, you will also be able to view your health information using other applications (apps) compatible with our system.

## 2022-02-11 NOTE — PROGRESS NOTE ADULT - SUBJECTIVE AND OBJECTIVE BOX
R2 GYN ONC Progress Note     Patient seen and examined at bedside.  No acute events overnight. No acute complaints.  Pain well controlled.  Patient is ambulating and tolerating.....   Has not yet passed flatus vs. Patient is passing flatus.    Patient is voiding spontaneously vs. Vegas is still in place.   Denies CP, SOB, N/V, fevers, and chills.    Vital Signs Last 24 Hours  T(C): 36.3 (02-11-22 @ 00:25), Max: 37.6 (02-10-22 @ 20:00)  HR: 65 (02-11-22 @ 03:58) (60 - 76)  BP: 110/42 (02-11-22 @ 00:25) (109/47 - 171/79)  RR: 17 (02-11-22 @ 00:25) (13 - 25)  SpO2: 99% (02-11-22 @ 03:58) (95% - 100%)    I&O's Summary    10 Feb 2022 07:01  -  11 Feb 2022 05:23  --------------------------------------------------------  IN: 255 mL / OUT: 630 mL / NET: -375 mL        Physical Exam:  General: NAD  CV: RRR  Lungs: CTA b/l, good air flow b/l   Abdomen: Soft, mildly-tender to palpation diffusely, softly distended, normoactive bowel sounds  Incision:  4 LSC port port sites C/D/I  Ext: No pain or swelling     Labs:                        12.7   11.19 )-----------( 262      ( 10 Feb 2022 17:51 )             38.5   baso 0.5    eos 0.4    imm gran 1.0    lymph 15.5   mono 2.3    poly 80.3       MEDICATIONS  (STANDING):  acetaminophen     Tablet .. 1000 milliGRAM(s) Oral every 6 hours  heparin   Injectable 7500 Unit(s) SubCutaneous every 8 hours  ibuprofen  Tablet. 600 milliGRAM(s) Oral every 6 hours  metoprolol tartrate Injectable 5 milliGRAM(s) IV Push every 6 hours  sodium chloride 0.9% lock flush 3 milliLiter(s) IV Push every 8 hours    MEDICATIONS  (PRN):  ondansetron    Tablet 8 milliGRAM(s) Oral every 8 hours PRN Nausea and/or Vomiting  oxyCODONE    IR 2.5 milliGRAM(s) Oral every 3 hours PRN Severe Pain (7 - 10)  senna 1 Tablet(s) Oral at bedtime PRN Constipation  simethicone 80 milliGRAM(s) Chew every 6 hours PRN Gas       R2 GYN ONC Progress Note     Patient seen and examined at bedside.  No acute events overnight. No acute complaints.  Pain well controlled.  Patient is ambulating and tolerating regular diet  Has not yet passed flatus.  Patient is voiding spontaneously.   Denies CP, SOB, N/V, fevers, and chills.    Vital Signs Last 24 Hours  T(C): 36.3 (02-11-22 @ 00:25), Max: 37.6 (02-10-22 @ 20:00)  HR: 65 (02-11-22 @ 03:58) (60 - 76)  BP: 110/42 (02-11-22 @ 00:25) (109/47 - 171/79)  RR: 17 (02-11-22 @ 00:25) (13 - 25)  SpO2: 99% (02-11-22 @ 03:58) (95% - 100%)    I&O's Summary    10 Feb 2022 07:01  -  11 Feb 2022 05:23  --------------------------------------------------------  IN: 255 mL / OUT: 630 mL / NET: -375 mL        Physical Exam:  General: NAD  CV: RRR  Lungs: CTA b/l, good air flow b/l   Abdomen: Soft, mildly-tender to palpation diffusely, softly distended, normoactive bowel sounds  Incision:  4 LSC port port sites C/D/I  Ext: No pain or swelling     Labs:                        12.7   11.19 )-----------( 262      ( 10 Feb 2022 17:51 )             38.5   baso 0.5    eos 0.4    imm gran 1.0    lymph 15.5   mono 2.3    poly 80.3       MEDICATIONS  (STANDING):  acetaminophen     Tablet .. 1000 milliGRAM(s) Oral every 6 hours  heparin   Injectable 7500 Unit(s) SubCutaneous every 8 hours  ibuprofen  Tablet. 600 milliGRAM(s) Oral every 6 hours  metoprolol tartrate Injectable 5 milliGRAM(s) IV Push every 6 hours  sodium chloride 0.9% lock flush 3 milliLiter(s) IV Push every 8 hours    MEDICATIONS  (PRN):  ondansetron    Tablet 8 milliGRAM(s) Oral every 8 hours PRN Nausea and/or Vomiting  oxyCODONE    IR 2.5 milliGRAM(s) Oral every 3 hours PRN Severe Pain (7 - 10)  senna 1 Tablet(s) Oral at bedtime PRN Constipation  simethicone 80 milliGRAM(s) Chew every 6 hours PRN Gas       R2 GYN ONC Progress Note     Patient seen and examined at bedside.  No acute events overnight. No acute complaints.  Pain well controlled.  Patient is ambulating and tolerating regular diet  Passing flatus.   Patient is voiding spontaneously.   Denies CP, SOB, N/V, fevers, and chills.    Vital Signs Last 24 Hours  T(C): 36.3 (02-11-22 @ 00:25), Max: 37.6 (02-10-22 @ 20:00)  HR: 65 (02-11-22 @ 03:58) (60 - 76)  BP: 110/42 (02-11-22 @ 00:25) (109/47 - 171/79)  RR: 17 (02-11-22 @ 00:25) (13 - 25)  SpO2: 99% (02-11-22 @ 03:58) (95% - 100%)    I&O's Summary    10 Feb 2022 07:01  -  11 Feb 2022 05:23  --------------------------------------------------------  IN: 255 mL / OUT: 630 mL / NET: -375 mL        Physical Exam:  General: NAD  CV: RRR  Lungs: CTA b/l, good air flow b/l   Abdomen: Soft, mildly-tender to palpation diffusely, softly distended, normoactive bowel sounds  Incision:  4 LSC port port sites C/D/I  Ext: No pain or swelling     Labs:                        12.7   11.19 )-----------( 262      ( 10 Feb 2022 17:51 )             38.5   baso 0.5    eos 0.4    imm gran 1.0    lymph 15.5   mono 2.3    poly 80.3       MEDICATIONS  (STANDING):  acetaminophen     Tablet .. 1000 milliGRAM(s) Oral every 6 hours  heparin   Injectable 7500 Unit(s) SubCutaneous every 8 hours  ibuprofen  Tablet. 600 milliGRAM(s) Oral every 6 hours  metoprolol tartrate Injectable 5 milliGRAM(s) IV Push every 6 hours  sodium chloride 0.9% lock flush 3 milliLiter(s) IV Push every 8 hours    MEDICATIONS  (PRN):  ondansetron    Tablet 8 milliGRAM(s) Oral every 8 hours PRN Nausea and/or Vomiting  oxyCODONE    IR 2.5 milliGRAM(s) Oral every 3 hours PRN Severe Pain (7 - 10)  senna 1 Tablet(s) Oral at bedtime PRN Constipation  simethicone 80 milliGRAM(s) Chew every 6 hours PRN Gas

## 2022-02-11 NOTE — DISCHARGE NOTE PROVIDER - PROVIDER TOKENS
PROVIDER:[TOKEN:[[id number]],SCHEDULEDAPPT:[02/25/2022],SCHEDULEDAPPTTIME:[10:30 AM],ESTABLISHEDPATIENT:[T]]

## 2022-02-14 ENCOUNTER — NON-APPOINTMENT (OUTPATIENT)
Age: 82
End: 2022-02-14

## 2022-02-14 LAB — NON-GYNECOLOGICAL CYTOLOGY STUDY: SIGNIFICANT CHANGE UP

## 2022-02-17 LAB — SURGICAL PATHOLOGY STUDY: SIGNIFICANT CHANGE UP

## 2022-02-18 ENCOUNTER — NON-APPOINTMENT (OUTPATIENT)
Age: 82
End: 2022-02-18

## 2022-02-25 ENCOUNTER — APPOINTMENT (OUTPATIENT)
Dept: GYNECOLOGIC ONCOLOGY | Facility: CLINIC | Age: 82
End: 2022-02-25
Payer: MEDICARE

## 2022-02-25 ENCOUNTER — NON-APPOINTMENT (OUTPATIENT)
Age: 82
End: 2022-02-25

## 2022-02-25 ENCOUNTER — OUTPATIENT (OUTPATIENT)
Dept: OUTPATIENT SERVICES | Facility: HOSPITAL | Age: 82
LOS: 1 days | Discharge: HOME | End: 2022-02-25
Payer: MEDICARE

## 2022-02-25 DIAGNOSIS — Z95.0 PRESENCE OF CARDIAC PACEMAKER: Chronic | ICD-10-CM

## 2022-02-25 DIAGNOSIS — Z95.2 PRESENCE OF PROSTHETIC HEART VALVE: Chronic | ICD-10-CM

## 2022-02-25 DIAGNOSIS — Z90.49 ACQUIRED ABSENCE OF OTHER SPECIFIED PARTS OF DIGESTIVE TRACT: Chronic | ICD-10-CM

## 2022-02-25 DIAGNOSIS — Z95.818 PRESENCE OF OTHER CARDIAC IMPLANTS AND GRAFTS: Chronic | ICD-10-CM

## 2022-02-28 ENCOUNTER — APPOINTMENT (OUTPATIENT)
Dept: GYNECOLOGIC ONCOLOGY | Facility: CLINIC | Age: 82
End: 2022-02-28
Payer: MEDICARE

## 2022-02-28 VITALS
HEART RATE: 82 BPM | SYSTOLIC BLOOD PRESSURE: 187 MMHG | TEMPERATURE: 97.6 F | HEIGHT: 61 IN | DIASTOLIC BLOOD PRESSURE: 81 MMHG

## 2022-02-28 PROCEDURE — 99024 POSTOP FOLLOW-UP VISIT: CPT

## 2022-03-02 ENCOUNTER — NON-APPOINTMENT (OUTPATIENT)
Age: 82
End: 2022-03-02

## 2022-03-03 ENCOUNTER — APPOINTMENT (OUTPATIENT)
Dept: RADIATION ONCOLOGY | Facility: CLINIC | Age: 82
End: 2022-03-03
Payer: MEDICARE

## 2022-03-03 VITALS
DIASTOLIC BLOOD PRESSURE: 77 MMHG | SYSTOLIC BLOOD PRESSURE: 145 MMHG | BODY MASS INDEX: 44.04 KG/M2 | HEIGHT: 61 IN | HEART RATE: 80 BPM | WEIGHT: 233.25 LBS | RESPIRATION RATE: 16 BRPM | OXYGEN SATURATION: 97 % | TEMPERATURE: 97 F

## 2022-03-03 DIAGNOSIS — Z80.3 FAMILY HISTORY OF MALIGNANT NEOPLASM OF BREAST: ICD-10-CM

## 2022-03-03 DIAGNOSIS — Z80.0 FAMILY HISTORY OF MALIGNANT NEOPLASM OF DIGESTIVE ORGANS: ICD-10-CM

## 2022-03-03 PROCEDURE — 99204 OFFICE O/P NEW MOD 45 MIN: CPT | Mod: GC,25

## 2022-03-03 NOTE — VITALS
[Maximal Pain Intensity: 0/10] : 0/10 [Least Pain Intensity: 0/10] : 0/10 [70: Cares for self; unalbe to carry on normal activity or do active work.] : 70: Cares for self; unable to carry on normal activity or do active work. [ECOG Performance Status: 2 - Ambulatory and capable of all self care but unable to carry out any work activities] : Performance Status: 2 - Ambulatory and capable of all self care but unable to carry out any work activities. Up and about more than 50% of waking hours [Date: ____________] : Patient's last distress assessment performed on [unfilled]. [5 - Distress Level] : Distress Level: 5

## 2022-03-04 PROBLEM — Z80.3 FAMILY HISTORY OF BREAST CANCER: Status: ACTIVE | Noted: 2022-03-04

## 2022-03-04 PROBLEM — Z80.0 FAMILY HISTORY OF PANCREATIC CANCER: Status: ACTIVE | Noted: 2022-03-04

## 2022-03-04 NOTE — DISEASE MANAGEMENT
[Pathological] : TNM Stage: p [IB] : IB [FreeTextEntry4] : ENDOMETRIAL CA [TTNM] : 1b [NTNM] : 0 [MTNM] : X

## 2022-03-04 NOTE — HISTORY OF PRESENT ILLNESS
[FreeTextEntry1] : Ms. Yeh is a 81 year old  postmenopausal female, w endometrial cancer. \par \par She saw Dr. Joy for an annual exam in 2021. Office sonogram \par was performed in 10/2021 which revealed bilateral ovarian cysts (right 0.36 x 0.6 x 0.3cm, left 0.6 x 0.5 x 0.5cm), EMS 5mm. F/u sonogram was obtained in 2021 which revealed stable right and left ovarian cyst as well as thickening of EMS to 10mm. Endometrial biopsy was performed in 2021 with pathology revealing FIGO 1 endometrial cancer.\par she was seen by Dr. Reis on  with plans for definitive surgery to be done after pacemaker and recovery period.\par She underwent robotic assisted total laparoscopic hysterectomy, bilateral salpingo-oophorectomy, annd sentinel node mapping and sampling on 2/10/22.\par Path: TAHBSO – G1 endometrial ca, 11/16 mm MMI, LVI, no CSI, BRANDON involved, pT1bN0\par 0/2 right and 0/3 left pelvic nodes\par ER+ loss of PMS2 and MLH1. Loss of nuclear expression of MLH1 and PMS2 is supportive of DNA mismatch repair deficiency and high frequency of microsatellite instability (MSI- H). Microsatellite unstable tumor with loss of MLH1 protein may appear in sporadic colorectal carcinoma and Lopez syndrome / HNPCC (hereditary non polyposis colorectal cancer). \par MLH promotor region status pending\par Today she is feeling well.\par She does report a feeling of urinary pressure and incomplete emptying, which predated the surgery.\par

## 2022-03-04 NOTE — PHYSICAL EXAM
[Normal] : well developed, well nourished, in no acute distress [General Appearance - In No Acute Distress] : in no acute distress [] : no respiratory distress

## 2022-03-04 NOTE — OB/GYN HISTORY
[unknown] : the patient is unsure of the date of her LMP [Currently In Menopause] : currently in menopause [Menopause Age: ____] : patient was [unfilled] years old at menopause [___] : Total Pregnancies: [unfilled] [Experiencing Menopausal Sxs] : not experiencing menopausal symptoms

## 2022-03-04 NOTE — REVIEW OF SYSTEMS
[Patient Intake Form Reviewed] : Patient intake form was reviewed [Palpitations] : palpitations [Lower Ext Edema] : lower extremity edema [SOB on Exertion] : shortness of breath during exertion [Abdominal Pain] : abdominal pain [Urinary Frequency] : urinary frequency [Skin Rash] : skin rash [Dizziness] : dizziness [Anxiety] : anxiety [Fever] : no fever [Chills] : no chills [Recent Change In Weight] : ~T no recent weight change [Visual Disturbances] : no visual disturbances [Dysphagia] : no dysphagia [Hearing Disturbances] : no hearing disturbances [Chest Pain] : no chest pain [Shortness Of Breath] : no shortness of breath [Vaginal Discharge] : no vaginal discharge [Dysmenorrhea/Abn Vaginal Bleeding] : no dysmenorrhea/abnormal vaginal bleeding [Fainting] : no fainting [Suicidal] : not suicidal [Depression] : no depression [Hot Flashes] : no hot flashes [FreeTextEntry5] : occasional palpitations [FreeTextEntry7] : r/t recent surgery [de-identified] : left ankle, itching/redness [FreeTextEntry8] : r/t diuretics , incomplete emptying [de-identified] : orthostatic, occasional [de-identified] : anxious regarding treatment plan, lost her  June 2021

## 2022-03-07 RX ORDER — ACETAMINOPHEN 325 MG/1
TABLET, FILM COATED ORAL
Refills: 0 | Status: ACTIVE | COMMUNITY

## 2022-03-18 PROCEDURE — 77263 THER RADIOLOGY TX PLNG CPLX: CPT

## 2022-03-22 ENCOUNTER — APPOINTMENT (OUTPATIENT)
Dept: RADIATION ONCOLOGY | Facility: CLINIC | Age: 82
End: 2022-03-22
Payer: MEDICARE

## 2022-03-22 PROCEDURE — 99212 OFFICE O/P EST SF 10 MIN: CPT | Mod: GC,25

## 2022-03-22 PROCEDURE — 77290 THER RAD SIMULAJ FIELD CPLX: CPT | Mod: 26

## 2022-03-22 PROCEDURE — 77334 RADIATION TREATMENT AID(S): CPT | Mod: 26

## 2022-03-22 NOTE — PHYSICAL EXAM
[Sclera] : the sclera and conjunctiva were normal [Outer Ear] : the ears and nose were normal in appearance [] : no respiratory distress [Heart Rate And Rhythm] : heart rate and rhythm were normal [Abdomen Soft] : soft [Nondistended] : nondistended [Abdomen Tenderness] : non-tender [Normal] : normal external genitalia [Normal Vaginal Cuff] : vaginal cuff without lesion or nodularity [Normal] : no palpable adenopathy [Supraclavicular Lymph Nodes Enlarged Bilaterally] : supraclavicular [No Focal Deficits] : no focal deficits [Oriented To Time, Place, And Person] : oriented to person, place, and time

## 2022-03-23 NOTE — HISTORY OF PRESENT ILLNESS
[FreeTextEntry1] : Ms. Yeh is an 81 woman s/p comprehensive surgical staging for a pT1bN0 G1 endometrial cancer with LVSI. She did have b/l negative LN sampling. She is planned for VBT and presents for CT Simulation today.\par \par History of Present Illness:\par 9/16/21 - Presented to Dr. Joy (GYN) for annual exam. \par 10/26/21 - US: b/l ovarian cysts, EMS 5mm.\par 12/7/21 - f/u US: stable b/l ovarian cysts as well as thickening of EMS to 10mm.\par 12/21/21 - Endometrial biopsy: FIGO grade 1 endometrial cancer. \par 2/10/22 - RA TLH, BSO SLN mapping and sampling: G1 endometrial CA, 11/16 mm MMI, LVI, no CSI, BRANDON involved, pT1bN0. 0/2 right and 0/3 left pelvic nodes. ER+ loss of PMS2 and MLH1.\par \par She returns today for consent for RT. She notes occasional pressure in the mornings but denies pain or discharge.

## 2022-03-27 PROCEDURE — 77295 3-D RADIOTHERAPY PLAN: CPT | Mod: 26

## 2022-03-29 PROCEDURE — 77770 HDR RDNCL NTRSTL/ICAV BRCHTX: CPT | Mod: 26

## 2022-03-29 PROCEDURE — 57156 INS VAG BRACHYTX DEVICE: CPT

## 2022-03-29 PROCEDURE — 77332 RADIATION TREATMENT AID(S): CPT | Mod: 26

## 2022-04-01 ENCOUNTER — APPOINTMENT (OUTPATIENT)
Dept: GYNECOLOGIC ONCOLOGY | Facility: CLINIC | Age: 82
End: 2022-04-01
Payer: MEDICARE

## 2022-04-01 VITALS
WEIGHT: 229 LBS | HEART RATE: 80 BPM | SYSTOLIC BLOOD PRESSURE: 159 MMHG | BODY MASS INDEX: 43.23 KG/M2 | DIASTOLIC BLOOD PRESSURE: 83 MMHG | HEIGHT: 61 IN

## 2022-04-01 PROCEDURE — 77332 RADIATION TREATMENT AID(S): CPT | Mod: 26

## 2022-04-01 PROCEDURE — 99024 POSTOP FOLLOW-UP VISIT: CPT

## 2022-04-01 PROCEDURE — 77770 HDR RDNCL NTRSTL/ICAV BRCHTX: CPT | Mod: 26

## 2022-04-01 PROCEDURE — 57156 INS VAG BRACHYTX DEVICE: CPT

## 2022-04-05 PROCEDURE — 77332 RADIATION TREATMENT AID(S): CPT | Mod: 26

## 2022-04-05 PROCEDURE — 57156 INS VAG BRACHYTX DEVICE: CPT

## 2022-04-05 PROCEDURE — 77770 HDR RDNCL NTRSTL/ICAV BRCHTX: CPT | Mod: 26

## 2022-04-18 ENCOUNTER — RESULT REVIEW (OUTPATIENT)
Age: 82
End: 2022-04-18

## 2022-04-19 ENCOUNTER — INPATIENT (INPATIENT)
Facility: HOSPITAL | Age: 82
LOS: 3 days | Discharge: HOME HEALTH | End: 2022-04-23
Attending: HOSPITALIST | Admitting: HOSPITALIST
Payer: MEDICARE

## 2022-04-19 VITALS
OXYGEN SATURATION: 98 % | SYSTOLIC BLOOD PRESSURE: 158 MMHG | DIASTOLIC BLOOD PRESSURE: 78 MMHG | HEIGHT: 60.5 IN | HEART RATE: 73 BPM | TEMPERATURE: 97 F | RESPIRATION RATE: 18 BRPM

## 2022-04-19 DIAGNOSIS — I25.10 ATHEROSCLEROTIC HEART DISEASE OF NATIVE CORONARY ARTERY WITHOUT ANGINA PECTORIS: ICD-10-CM

## 2022-04-19 DIAGNOSIS — Z95.818 PRESENCE OF OTHER CARDIAC IMPLANTS AND GRAFTS: Chronic | ICD-10-CM

## 2022-04-19 DIAGNOSIS — Z95.2 PRESENCE OF PROSTHETIC HEART VALVE: Chronic | ICD-10-CM

## 2022-04-19 DIAGNOSIS — C54.1 MALIGNANT NEOPLASM OF ENDOMETRIUM: ICD-10-CM

## 2022-04-19 DIAGNOSIS — I50.9 HEART FAILURE, UNSPECIFIED: ICD-10-CM

## 2022-04-19 DIAGNOSIS — Z29.9 ENCOUNTER FOR PROPHYLACTIC MEASURES, UNSPECIFIED: ICD-10-CM

## 2022-04-19 DIAGNOSIS — Z95.0 PRESENCE OF CARDIAC PACEMAKER: Chronic | ICD-10-CM

## 2022-04-19 DIAGNOSIS — I50.22 CHRONIC SYSTOLIC (CONGESTIVE) HEART FAILURE: ICD-10-CM

## 2022-04-19 DIAGNOSIS — G47.33 OBSTRUCTIVE SLEEP APNEA (ADULT) (PEDIATRIC): ICD-10-CM

## 2022-04-19 DIAGNOSIS — R73.9 HYPERGLYCEMIA, UNSPECIFIED: ICD-10-CM

## 2022-04-19 DIAGNOSIS — I10 ESSENTIAL (PRIMARY) HYPERTENSION: ICD-10-CM

## 2022-04-19 DIAGNOSIS — N39.0 URINARY TRACT INFECTION, SITE NOT SPECIFIED: ICD-10-CM

## 2022-04-19 DIAGNOSIS — E11.10 TYPE 2 DIABETES MELLITUS WITH KETOACIDOSIS WITHOUT COMA: ICD-10-CM

## 2022-04-19 DIAGNOSIS — Z90.49 ACQUIRED ABSENCE OF OTHER SPECIFIED PARTS OF DIGESTIVE TRACT: Chronic | ICD-10-CM

## 2022-04-19 LAB
A1C WITH ESTIMATED AVERAGE GLUCOSE RESULT: 11.1 % — HIGH (ref 4–5.6)
ALBUMIN SERPL ELPH-MCNC: 4.8 G/DL — SIGNIFICANT CHANGE UP (ref 3.3–5)
ALP SERPL-CCNC: 165 U/L — HIGH (ref 40–120)
ALT FLD-CCNC: 28 U/L — SIGNIFICANT CHANGE UP (ref 4–33)
ANION GAP SERPL CALC-SCNC: 17 MMOL/L — HIGH (ref 7–14)
APPEARANCE UR: CLEAR — SIGNIFICANT CHANGE UP
AST SERPL-CCNC: 24 U/L — SIGNIFICANT CHANGE UP (ref 4–32)
B PERT DNA SPEC QL NAA+PROBE: SIGNIFICANT CHANGE UP
B PERT+PARAPERT DNA PNL SPEC NAA+PROBE: SIGNIFICANT CHANGE UP
B-OH-BUTYR SERPL-SCNC: 0.7 MMOL/L — HIGH (ref 0–0.4)
BACTERIA # UR AUTO: NEGATIVE — SIGNIFICANT CHANGE UP
BASE EXCESS BLDV CALC-SCNC: 2.2 MMOL/L — SIGNIFICANT CHANGE UP (ref -2–3)
BASE EXCESS BLDV CALC-SCNC: 2.9 MMOL/L — SIGNIFICANT CHANGE UP (ref -2–3)
BASOPHILS # BLD AUTO: 0.04 K/UL — SIGNIFICANT CHANGE UP (ref 0–0.2)
BASOPHILS NFR BLD AUTO: 0.5 % — SIGNIFICANT CHANGE UP (ref 0–2)
BILIRUB SERPL-MCNC: 0.3 MG/DL — SIGNIFICANT CHANGE UP (ref 0.2–1.2)
BILIRUB UR-MCNC: NEGATIVE — SIGNIFICANT CHANGE UP
BLOOD GAS VENOUS COMPREHENSIVE RESULT: SIGNIFICANT CHANGE UP
BLOOD GAS VENOUS COMPREHENSIVE RESULT: SIGNIFICANT CHANGE UP
BORDETELLA PARAPERTUSSIS (RAPRVP): SIGNIFICANT CHANGE UP
BUN SERPL-MCNC: 17 MG/DL — SIGNIFICANT CHANGE UP (ref 7–23)
C PNEUM DNA SPEC QL NAA+PROBE: SIGNIFICANT CHANGE UP
CALCIUM SERPL-MCNC: 9.9 MG/DL — SIGNIFICANT CHANGE UP (ref 8.4–10.5)
CHLORIDE BLDV-SCNC: 94 MMOL/L — LOW (ref 96–108)
CHLORIDE BLDV-SCNC: 98 MMOL/L — SIGNIFICANT CHANGE UP (ref 96–108)
CHLORIDE SERPL-SCNC: 91 MMOL/L — LOW (ref 98–107)
CO2 BLDV-SCNC: 30.4 MMOL/L — HIGH (ref 22–26)
CO2 BLDV-SCNC: 31.5 MMOL/L — HIGH (ref 22–26)
CO2 SERPL-SCNC: 24 MMOL/L — SIGNIFICANT CHANGE UP (ref 22–31)
COLOR SPEC: YELLOW — SIGNIFICANT CHANGE UP
CREAT SERPL-MCNC: 0.65 MG/DL — SIGNIFICANT CHANGE UP (ref 0.5–1.3)
DIFF PNL FLD: NEGATIVE — SIGNIFICANT CHANGE UP
EGFR: 88 ML/MIN/1.73M2 — SIGNIFICANT CHANGE UP
EOSINOPHIL # BLD AUTO: 0.09 K/UL — SIGNIFICANT CHANGE UP (ref 0–0.5)
EOSINOPHIL NFR BLD AUTO: 1 % — SIGNIFICANT CHANGE UP (ref 0–6)
EPI CELLS # UR: 3 /HPF — SIGNIFICANT CHANGE UP (ref 0–5)
ESTIMATED AVERAGE GLUCOSE: 272 — SIGNIFICANT CHANGE UP
FLUAV SUBTYP SPEC NAA+PROBE: SIGNIFICANT CHANGE UP
FLUBV RNA SPEC QL NAA+PROBE: SIGNIFICANT CHANGE UP
GAS PNL BLDV: 130 MMOL/L — LOW (ref 136–145)
GAS PNL BLDV: 132 MMOL/L — LOW (ref 136–145)
GAS PNL BLDV: SIGNIFICANT CHANGE UP
GLUCOSE BLDC GLUCOMTR-MCNC: 231 MG/DL — HIGH (ref 70–99)
GLUCOSE BLDC GLUCOMTR-MCNC: 262 MG/DL — HIGH (ref 70–99)
GLUCOSE BLDC GLUCOMTR-MCNC: 391 MG/DL — HIGH (ref 70–99)
GLUCOSE BLDV-MCNC: 451 MG/DL — CRITICAL HIGH (ref 70–99)
GLUCOSE BLDV-MCNC: 552 MG/DL — CRITICAL HIGH (ref 70–99)
GLUCOSE SERPL-MCNC: 491 MG/DL — CRITICAL HIGH (ref 70–99)
GLUCOSE UR QL: ABNORMAL
HADV DNA SPEC QL NAA+PROBE: SIGNIFICANT CHANGE UP
HCO3 BLDV-SCNC: 29 MMOL/L — SIGNIFICANT CHANGE UP (ref 22–29)
HCO3 BLDV-SCNC: 30 MMOL/L — HIGH (ref 22–29)
HCOV 229E RNA SPEC QL NAA+PROBE: SIGNIFICANT CHANGE UP
HCOV HKU1 RNA SPEC QL NAA+PROBE: SIGNIFICANT CHANGE UP
HCOV NL63 RNA SPEC QL NAA+PROBE: SIGNIFICANT CHANGE UP
HCOV OC43 RNA SPEC QL NAA+PROBE: SIGNIFICANT CHANGE UP
HCT VFR BLD CALC: 47.2 % — HIGH (ref 34.5–45)
HCT VFR BLDA CALC: 41 % — SIGNIFICANT CHANGE UP (ref 34.5–46.5)
HCT VFR BLDA CALC: 47 % — HIGH (ref 34.5–46.5)
HGB BLD CALC-MCNC: 13.7 G/DL — SIGNIFICANT CHANGE UP (ref 11.5–15.5)
HGB BLD CALC-MCNC: 15.7 G/DL — HIGH (ref 11.5–15.5)
HGB BLD-MCNC: 16 G/DL — HIGH (ref 11.5–15.5)
HMPV RNA SPEC QL NAA+PROBE: SIGNIFICANT CHANGE UP
HPIV1 RNA SPEC QL NAA+PROBE: SIGNIFICANT CHANGE UP
HPIV2 RNA SPEC QL NAA+PROBE: SIGNIFICANT CHANGE UP
HPIV3 RNA SPEC QL NAA+PROBE: SIGNIFICANT CHANGE UP
HPIV4 RNA SPEC QL NAA+PROBE: SIGNIFICANT CHANGE UP
HYALINE CASTS # UR AUTO: 8 /LPF — HIGH (ref 0–7)
IANC: 5.77 K/UL — SIGNIFICANT CHANGE UP (ref 1.8–7.4)
IMM GRANULOCYTES NFR BLD AUTO: 0.3 % — SIGNIFICANT CHANGE UP (ref 0–1.5)
KETONES UR-MCNC: ABNORMAL
LACTATE BLDV-MCNC: 1.6 MMOL/L — SIGNIFICANT CHANGE UP (ref 0.5–2)
LACTATE BLDV-MCNC: 2.2 MMOL/L — HIGH (ref 0.5–2)
LEUKOCYTE ESTERASE UR-ACNC: ABNORMAL
LYMPHOCYTES # BLD AUTO: 2.24 K/UL — SIGNIFICANT CHANGE UP (ref 1–3.3)
LYMPHOCYTES # BLD AUTO: 25.8 % — SIGNIFICANT CHANGE UP (ref 13–44)
M PNEUMO DNA SPEC QL NAA+PROBE: SIGNIFICANT CHANGE UP
MAGNESIUM SERPL-MCNC: 2.2 MG/DL — SIGNIFICANT CHANGE UP (ref 1.6–2.6)
MCHC RBC-ENTMCNC: 28.6 PG — SIGNIFICANT CHANGE UP (ref 27–34)
MCHC RBC-ENTMCNC: 33.9 GM/DL — SIGNIFICANT CHANGE UP (ref 32–36)
MCV RBC AUTO: 84.3 FL — SIGNIFICANT CHANGE UP (ref 80–100)
MONOCYTES # BLD AUTO: 0.51 K/UL — SIGNIFICANT CHANGE UP (ref 0–0.9)
MONOCYTES NFR BLD AUTO: 5.9 % — SIGNIFICANT CHANGE UP (ref 2–14)
NEUTROPHILS # BLD AUTO: 5.77 K/UL — SIGNIFICANT CHANGE UP (ref 1.8–7.4)
NEUTROPHILS NFR BLD AUTO: 66.5 % — SIGNIFICANT CHANGE UP (ref 43–77)
NITRITE UR-MCNC: NEGATIVE — SIGNIFICANT CHANGE UP
NRBC # BLD: 0 /100 WBCS — SIGNIFICANT CHANGE UP
NRBC # FLD: 0 K/UL — SIGNIFICANT CHANGE UP
PCO2 BLDV: 51 MMHG — HIGH (ref 39–42)
PCO2 BLDV: 54 MMHG — HIGH (ref 39–42)
PH BLDV: 7.35 — SIGNIFICANT CHANGE UP (ref 7.32–7.43)
PH BLDV: 7.36 — SIGNIFICANT CHANGE UP (ref 7.32–7.43)
PH UR: 6.5 — SIGNIFICANT CHANGE UP (ref 5–8)
PHOSPHATE SERPL-MCNC: 3.5 MG/DL — SIGNIFICANT CHANGE UP (ref 2.5–4.5)
PLATELET # BLD AUTO: 233 K/UL — SIGNIFICANT CHANGE UP (ref 150–400)
PO2 BLDV: 36 MMHG — SIGNIFICANT CHANGE UP
PO2 BLDV: 39 MMHG — SIGNIFICANT CHANGE UP
POTASSIUM BLDV-SCNC: 4.4 MMOL/L — SIGNIFICANT CHANGE UP (ref 3.5–5.1)
POTASSIUM BLDV-SCNC: 4.8 MMOL/L — SIGNIFICANT CHANGE UP (ref 3.5–5.1)
POTASSIUM SERPL-MCNC: 4.9 MMOL/L — SIGNIFICANT CHANGE UP (ref 3.5–5.3)
POTASSIUM SERPL-SCNC: 4.9 MMOL/L — SIGNIFICANT CHANGE UP (ref 3.5–5.3)
PROT SERPL-MCNC: 8.4 G/DL — HIGH (ref 6–8.3)
PROT UR-MCNC: ABNORMAL
RAPID RVP RESULT: SIGNIFICANT CHANGE UP
RBC # BLD: 5.6 M/UL — HIGH (ref 3.8–5.2)
RBC # FLD: 13.4 % — SIGNIFICANT CHANGE UP (ref 10.3–14.5)
RBC CASTS # UR COMP ASSIST: 4 /HPF — SIGNIFICANT CHANGE UP (ref 0–4)
RSV RNA SPEC QL NAA+PROBE: SIGNIFICANT CHANGE UP
RV+EV RNA SPEC QL NAA+PROBE: SIGNIFICANT CHANGE UP
SAO2 % BLDV: 61.7 % — SIGNIFICANT CHANGE UP
SAO2 % BLDV: 62.4 % — SIGNIFICANT CHANGE UP
SARS-COV-2 RNA SPEC QL NAA+PROBE: SIGNIFICANT CHANGE UP
SODIUM SERPL-SCNC: 132 MMOL/L — LOW (ref 135–145)
SP GR SPEC: 1.04 — SIGNIFICANT CHANGE UP (ref 1–1.05)
UROBILINOGEN FLD QL: SIGNIFICANT CHANGE UP
WBC # BLD: 8.68 K/UL — SIGNIFICANT CHANGE UP (ref 3.8–10.5)
WBC # FLD AUTO: 8.68 K/UL — SIGNIFICANT CHANGE UP (ref 3.8–10.5)
WBC UR QL: 89 /HPF — HIGH (ref 0–5)

## 2022-04-19 PROCEDURE — 71045 X-RAY EXAM CHEST 1 VIEW: CPT | Mod: 26

## 2022-04-19 PROCEDURE — 99223 1ST HOSP IP/OBS HIGH 75: CPT | Mod: GC,AI

## 2022-04-19 PROCEDURE — 99285 EMERGENCY DEPT VISIT HI MDM: CPT | Mod: GC

## 2022-04-19 RX ORDER — FLUCONAZOLE 150 MG/1
150 TABLET ORAL ONCE
Refills: 0 | Status: COMPLETED | OUTPATIENT
Start: 2022-04-19 | End: 2022-04-19

## 2022-04-19 RX ORDER — METOPROLOL TARTRATE 50 MG
50 TABLET ORAL DAILY
Refills: 0 | Status: DISCONTINUED | OUTPATIENT
Start: 2022-04-19 | End: 2022-04-23

## 2022-04-19 RX ORDER — GLUCAGON INJECTION, SOLUTION 0.5 MG/.1ML
1 INJECTION, SOLUTION SUBCUTANEOUS ONCE
Refills: 0 | Status: DISCONTINUED | OUTPATIENT
Start: 2022-04-19 | End: 2022-04-20

## 2022-04-19 RX ORDER — CEFTRIAXONE 500 MG/1
1000 INJECTION, POWDER, FOR SOLUTION INTRAMUSCULAR; INTRAVENOUS EVERY 24 HOURS
Refills: 0 | Status: COMPLETED | OUTPATIENT
Start: 2022-04-20 | End: 2022-04-22

## 2022-04-19 RX ORDER — INSULIN GLARGINE 100 [IU]/ML
15 INJECTION, SOLUTION SUBCUTANEOUS ONCE
Refills: 0 | Status: COMPLETED | OUTPATIENT
Start: 2022-04-19 | End: 2022-04-19

## 2022-04-19 RX ORDER — ASPIRIN/CALCIUM CARB/MAGNESIUM 324 MG
0 TABLET ORAL
Qty: 0 | Refills: 0 | DISCHARGE

## 2022-04-19 RX ORDER — SODIUM CHLORIDE 9 MG/ML
1000 INJECTION INTRAMUSCULAR; INTRAVENOUS; SUBCUTANEOUS
Refills: 0 | Status: DISCONTINUED | OUTPATIENT
Start: 2022-04-19 | End: 2022-04-20

## 2022-04-19 RX ORDER — DEXTROSE 50 % IN WATER 50 %
25 SYRINGE (ML) INTRAVENOUS ONCE
Refills: 0 | Status: DISCONTINUED | OUTPATIENT
Start: 2022-04-19 | End: 2022-04-20

## 2022-04-19 RX ORDER — SODIUM CHLORIDE 9 MG/ML
1000 INJECTION INTRAMUSCULAR; INTRAVENOUS; SUBCUTANEOUS ONCE
Refills: 0 | Status: COMPLETED | OUTPATIENT
Start: 2022-04-19 | End: 2022-04-19

## 2022-04-19 RX ORDER — SUCRALFATE 1 G
1 TABLET ORAL ONCE
Refills: 0 | Status: COMPLETED | OUTPATIENT
Start: 2022-04-19 | End: 2022-04-19

## 2022-04-19 RX ORDER — INSULIN LISPRO 100/ML
VIAL (ML) SUBCUTANEOUS EVERY 6 HOURS
Refills: 0 | Status: DISCONTINUED | OUTPATIENT
Start: 2022-04-19 | End: 2022-04-20

## 2022-04-19 RX ORDER — CHOLECALCIFEROL (VITAMIN D3) 125 MCG
1 CAPSULE ORAL
Qty: 0 | Refills: 0 | DISCHARGE

## 2022-04-19 RX ORDER — DEXTROSE 50 % IN WATER 50 %
15 SYRINGE (ML) INTRAVENOUS ONCE
Refills: 0 | Status: DISCONTINUED | OUTPATIENT
Start: 2022-04-19 | End: 2022-04-20

## 2022-04-19 RX ORDER — CEFTRIAXONE 500 MG/1
1000 INJECTION, POWDER, FOR SOLUTION INTRAMUSCULAR; INTRAVENOUS ONCE
Refills: 0 | Status: COMPLETED | OUTPATIENT
Start: 2022-04-19 | End: 2022-04-19

## 2022-04-19 RX ORDER — INSULIN LISPRO 100/ML
6 VIAL (ML) SUBCUTANEOUS ONCE
Refills: 0 | Status: COMPLETED | OUTPATIENT
Start: 2022-04-19 | End: 2022-04-19

## 2022-04-19 RX ORDER — METOPROLOL TARTRATE 50 MG
1 TABLET ORAL
Qty: 0 | Refills: 0 | DISCHARGE

## 2022-04-19 RX ORDER — ASPIRIN/CALCIUM CARB/MAGNESIUM 324 MG
81 TABLET ORAL DAILY
Refills: 0 | Status: DISCONTINUED | OUTPATIENT
Start: 2022-04-19 | End: 2022-04-23

## 2022-04-19 RX ORDER — ENOXAPARIN SODIUM 100 MG/ML
40 INJECTION SUBCUTANEOUS EVERY 12 HOURS
Refills: 0 | Status: DISCONTINUED | OUTPATIENT
Start: 2022-04-19 | End: 2022-04-20

## 2022-04-19 RX ORDER — ENOXAPARIN SODIUM 100 MG/ML
40 INJECTION SUBCUTANEOUS EVERY 24 HOURS
Refills: 0 | Status: DISCONTINUED | OUTPATIENT
Start: 2022-04-19 | End: 2022-04-19

## 2022-04-19 RX ORDER — PANTOPRAZOLE SODIUM 20 MG/1
40 TABLET, DELAYED RELEASE ORAL
Refills: 0 | Status: DISCONTINUED | OUTPATIENT
Start: 2022-04-19 | End: 2022-04-23

## 2022-04-19 RX ORDER — DEXTROSE 50 % IN WATER 50 %
12.5 SYRINGE (ML) INTRAVENOUS ONCE
Refills: 0 | Status: DISCONTINUED | OUTPATIENT
Start: 2022-04-19 | End: 2022-04-20

## 2022-04-19 RX ORDER — SODIUM CHLORIDE 9 MG/ML
500 INJECTION INTRAMUSCULAR; INTRAVENOUS; SUBCUTANEOUS ONCE
Refills: 0 | Status: COMPLETED | OUTPATIENT
Start: 2022-04-19 | End: 2022-04-19

## 2022-04-19 RX ORDER — FAMOTIDINE 10 MG/ML
1 INJECTION INTRAVENOUS
Qty: 0 | Refills: 0 | DISCHARGE

## 2022-04-19 RX ORDER — INSULIN GLARGINE 100 [IU]/ML
20 INJECTION, SOLUTION SUBCUTANEOUS ONCE
Refills: 0 | Status: DISCONTINUED | OUTPATIENT
Start: 2022-04-19 | End: 2022-04-19

## 2022-04-19 RX ORDER — METOCLOPRAMIDE HCL 10 MG
10 TABLET ORAL ONCE
Refills: 0 | Status: COMPLETED | OUTPATIENT
Start: 2022-04-19 | End: 2022-04-19

## 2022-04-19 RX ORDER — OMEPRAZOLE 10 MG/1
1 CAPSULE, DELAYED RELEASE ORAL
Qty: 0 | Refills: 0 | DISCHARGE

## 2022-04-19 RX ORDER — ACETAMINOPHEN 500 MG
650 TABLET ORAL ONCE
Refills: 0 | Status: COMPLETED | OUTPATIENT
Start: 2022-04-19 | End: 2022-04-19

## 2022-04-19 RX ORDER — CEFTRIAXONE 500 MG/1
INJECTION, POWDER, FOR SOLUTION INTRAMUSCULAR; INTRAVENOUS
Refills: 0 | Status: COMPLETED | OUTPATIENT
Start: 2022-04-19 | End: 2022-04-23

## 2022-04-19 RX ADMIN — Medication 650 MILLIGRAM(S): at 16:31

## 2022-04-19 RX ADMIN — FLUCONAZOLE 150 MILLIGRAM(S): 150 TABLET ORAL at 20:53

## 2022-04-19 RX ADMIN — Medication 1 GRAM(S): at 15:07

## 2022-04-19 RX ADMIN — Medication 10 MILLIGRAM(S): at 15:01

## 2022-04-19 RX ADMIN — SODIUM CHLORIDE 1000 MILLILITER(S): 9 INJECTION INTRAMUSCULAR; INTRAVENOUS; SUBCUTANEOUS at 16:50

## 2022-04-19 RX ADMIN — Medication 6 UNIT(S): at 16:50

## 2022-04-19 RX ADMIN — SODIUM CHLORIDE 75 MILLILITER(S): 9 INJECTION INTRAMUSCULAR; INTRAVENOUS; SUBCUTANEOUS at 20:54

## 2022-04-19 RX ADMIN — SODIUM CHLORIDE 500 MILLILITER(S): 9 INJECTION INTRAMUSCULAR; INTRAVENOUS; SUBCUTANEOUS at 16:50

## 2022-04-19 RX ADMIN — Medication 4: at 23:29

## 2022-04-19 RX ADMIN — SODIUM CHLORIDE 1000 MILLILITER(S): 9 INJECTION INTRAMUSCULAR; INTRAVENOUS; SUBCUTANEOUS at 15:02

## 2022-04-19 RX ADMIN — CEFTRIAXONE 1000 MILLIGRAM(S): 500 INJECTION, POWDER, FOR SOLUTION INTRAMUSCULAR; INTRAVENOUS at 20:53

## 2022-04-19 RX ADMIN — INSULIN GLARGINE 15 UNIT(S): 100 INJECTION, SOLUTION SUBCUTANEOUS at 20:53

## 2022-04-19 RX ADMIN — Medication 650 MILLIGRAM(S): at 15:01

## 2022-04-19 NOTE — H&P ADULT - NSHPPHYSICALEXAM_GEN_ALL_CORE
ICU Vital Signs Last 24 Hrs  T(C): 36.1 (19 Apr 2022 12:53), Max: 36.1 (19 Apr 2022 12:53)  T(F): 97 (19 Apr 2022 12:53), Max: 97 (19 Apr 2022 12:53)  HR: 73 (19 Apr 2022 12:53) (73 - 73)  BP: 158/78 (19 Apr 2022 12:53) (158/78 - 158/78)  BP(mean): --  ABP: --  ABP(mean): --  RR: 18 (19 Apr 2022 12:53) (18 - 18)  SpO2: 98% (19 Apr 2022 12:53) (98% - 98%)    PHYSICAL EXAM:  GENERAL: NAD, well-groomed, well-developed  HEAD:  Atraumatic, Normocephalic  EYES: EOMI, PERRLA, conjunctiva and sclera clear  ENMT: No tonsillar erythema, exudates, or enlargement; Moist mucous membranes  NECK: Supple, No JVD, Normal thyroid  HEART: Regular rate and rhythm; No murmurs, rubs, or gallops  RESPIRATORY: CTA B/L, No W/R/R  ABDOMEN: Soft, Nontender, Nondistended; Bowel sounds present  NEUROLOGY: A&Ox3, nonfocal, moving all extremities  EXTREMITIES:  2+ Peripheral Pulses, No clubbing, cyanosis, or edema  SKIN: warm, dry, normal color, no rash or abnormal lesions ICU Vital Signs Last 24 Hrs  T(C): 36.1 (19 Apr 2022 12:53), Max: 36.1 (19 Apr 2022 12:53)  T(F): 97 (19 Apr 2022 12:53), Max: 97 (19 Apr 2022 12:53)  HR: 73 (19 Apr 2022 12:53) (73 - 73)  BP: 158/78 (19 Apr 2022 12:53) (158/78 - 158/78)  BP(mean): --  ABP: --  ABP(mean): --  RR: 18 (19 Apr 2022 12:53) (18 - 18)  SpO2: 98% (19 Apr 2022 12:53) (98% - 98%)    PHYSICAL EXAM:  GENERAL: NAD, well-groomed, well-developed, obese, AOx3  HEAD:  Atraumatic, Normocephalic  EYES: EOMI, PERRLA, conjunctiva and sclera clear  ENMT: No tonsillar erythema, exudates, or enlargement; Moist mucous membranes  NECK: Supple, No JVD, Normal thyroid  HEART: Regular rate and rhythm; No murmurs, rubs, or gallops  RESPIRATORY: CTA B/L, No W/R/R  ABDOMEN: Soft, Nontender, Nondistended; Bowel sounds present  NEUROLOGY: A&Ox3, nonfocal, moving all extremities  EXTREMITIES:  2+ Peripheral Pulses, No clubbing, cyanosis, or edema  SKIN: warm, dry, normal color, no rash or abnormal lesions

## 2022-04-19 NOTE — ED PROVIDER NOTE - PHYSICAL EXAMINATION
G: NAD, chronically ill appearing, cooperative with exam   H: NCAT  E: EOMI   M: Mucous membranes moist   R: CTABL, nWOB  C: RRR  A: Soft, NT/ND, no rebound/guarding

## 2022-04-19 NOTE — ED ADULT NURSE NOTE - NS PRO AD ANY ON CHART
IMPRESSION:    Acute hypoxemic respiratory failure on 40% sp reintubation  Subqemphysema  Encephalitis/ ? GBS/ MF followed by neurology  SP Plasmapheresis and pulse steroids SP TESSIO  COVID 19 infection 1/19  Uterine lesion probably fibroid  Acute on Chronic anemia, no active bleed  ileus improved  fever improved    PLAN:    CNS: SAT    HEENT: Oral care    PULMONARY:  HOB @ 45 degrees.  Aspiration precautions.  Vent settings:  Wean FiO2,  PEEP to 8.  Monitor peak & plateau pressure.  Aggressive pulmonary toilet. need trach    CARDIOVASCULAR:  Avoid volume overload.    GI: GI prophylaxis.  start feeding if cleared by GI  repeat KUB FUP RESULT    RENAL:  Follow up lytes.  Correct as needed.  Lacy care.      INFECTIOUS DISEASE: , ABX PER ID    HEMATOLOGICAL:  DVT prophylaxis.    Keep Hb > 7.    ENDOCRINE:  Follow up FS.  Insulin protocol if needed.    MUSCULOSKELETAL:  Bed rest.    rij 2/7  tessio 2/10  lacy 2/8  Guarded prognosis  Monitor in MICU Yes

## 2022-04-19 NOTE — H&P ADULT - PROBLEM SELECTOR PLAN 3
- hx of endometrial adenocarcinoma, s/p radiation x3, most recently 1 wk ago. Concern for vaginal dryness / candidiasis in setting of recent treatment  - will f/u with OP GYN in AM

## 2022-04-19 NOTE — H&P ADULT - PROBLEM SELECTOR PLAN 2
- Presenting with 2 day history of dysurea, frequency, in addition to one week of vaginal pruritis. Per patient, was recently diagnosed with UTI with OP GYN and started on Cipro and Keflex  - Start Ceftriaxone 1g q24h x 3 days  - f/u UA  - f/u UCx - Presenting with 2 day history of dysurea, frequency, in addition to one week of vaginal pruritis. Per patient, was recently diagnosed with UTI with OP GYN and started on Cipro and Keflex  - Start Ceftriaxone 1g q24h x 3 days  - s/p Diflucan x1 for vanginal candidiasis   - f/u UA  - f/u UCx

## 2022-04-19 NOTE — H&P ADULT - NSHPSOCIALHISTORY_GEN_ALL_CORE
Alcohol:   Cigarette Use:  Diet:   Sick Contacts:  Recent Travel: Alcohol: denies  Cigarette Use: denies  Diet:  denies  Sick Contacts: denies  Recent Travel: denies

## 2022-04-19 NOTE — H&P ADULT - PROBLEM SELECTOR PLAN 5
- Patient with hx of HTN, per EMR, recently noted to have uncotnrolled HTN, and therefore metoprolol increased to 50 BID  - c/w metoprolol

## 2022-04-19 NOTE — H&P ADULT - ATTENDING COMMENTS
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 (cardiologist Dr. Tee, Sean Tinoco), c/b heart block s/p dual chamber PPM 1/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 Denies h/o dm or recently on steroids per son/daughter at bedside. In ED, found to have blood glucose 400-500's, given insulin and 2L NS bolus.  PE: lung clear, abdomen soft, heart regular, 1+leg edema    A/P:  #newly diagnosed DM2, hyperosmolar nonketotic state: AB 17, beta-hydroxybutyrate 0.7, a1c 11.1  lantus 20u tonight for BW= 100kg, endocrine consult, IVF NS 75 cc/h  trend Lytes and monitor AG  NPO for now but can drink water, moderate ISS  # dehydration w/ hemoconcentration d/t severe hyperglycemia: IVF as above  # h/o cad/chf/tavr: check TTE  hold diuretic (lasix and aldactone) for now, c/w metoprolol and asa  # UTI/vaginal yeast infection: diflucan [id number], Ceftriaxone, Urine culture  # GERD: c/w PPI  # prophylactic lovenox for dvt ppx  case d/w son and daughter at bedside 81F with h/o aortic stenosis, nonobstructive cad, chf, 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 Tee, Sean Tinoco), c/b heart block s/p dual chamber PPM 1/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 Denies h/o dm or recently on steroids per son/daughter at bedside. In ED, found to have blood glucose 400-500's, given insulin and 2L NS bolus.  PE: lung clear, abdomen soft, heart regular, 1+leg edema    A/P:  #newly diagnosed DM2, hyperosmolar nonketotic state (HHS): AG elevated 17, beta-hydroxybutyrate 0.7, a1c 11.1  lantus 20u tonight for BW= 100kg, endocrine consult, IVF NS 75 cc/h  trend Lytes and monitor AG  NPO for now but can drink water, moderate ISS  # dehydration w/ hemoconcentration d/t severe hyperglycemia: IVF as above  # h/o cad/chf/tavr: check TTE  hold diuretic (lasix and aldactone) for now, c/w metoprolol and asa  # UTI/vaginal yeast infection: diflucan [id number], Ceftriaxone, Urine culture  # GERD: c/w PPI  # prophylactic lovenox for dvt ppx  case d/w son and daughter at bedside

## 2022-04-19 NOTE — H&P ADULT - ASSESSMENT
81 year old female PMH pacemaker, endometrial adenocarcinoma s/p hysterectomy in Jan and radiation 1 wks ago, HTN, GERD, CHF presents today with nausea, loss of appetite, mouth dry asnd bitter, and acid reflux. admitted for DKA vs HHS, on NS IVF and lasix.

## 2022-04-19 NOTE — ED ADULT TRIAGE NOTE - CHIEF COMPLAINT QUOTE
Pt c/o .  Seen by PMD today for HTN and found pt to be hyperglycemic.  C/O nauseous, loss of appetite, mouth dry asnd bitter, and acid reflux.  Eating poorly.  S/P hysterectomy 1 month ago and was seen by GYN yesterday for UTO and bladder infection with elevated BP.  Radiation treatment last week for endometrial cancer.  Hx pacemaker

## 2022-04-19 NOTE — H&P ADULT - PROBLEM SELECTOR PLAN 1
- Patient presenting with likely newly diagnosed DM2, hyperosmolar nonketotic state (HHS)  - In ED: AG elevated 17, beta-hydroxybutyrate 0.7, a1c 11.1  - In ED: Glucose 550 -> 330 s/p 6U insulin  - START Lantus 15U bedtime, obtain official weight  - IVF NS 75cc/hr in setting of CHF  - Endo consult in AM  - Trend Lytes and AG, BHB  - NPO for now but can drink water, moderate ISS  - Repeat BMP

## 2022-04-19 NOTE — H&P ADULT - NSHPLABSRESULTS_GEN_ALL_CORE
.  LABS:                         16.0   8.68  )-----------( 233      ( 19 Apr 2022 15:28 )             47.2     04-19    132<L>  |  91<L>  |  17  ----------------------------<  491<HH>  4.9   |  24  |  0.65    Ca    9.9      19 Apr 2022 15:28  Phos  3.5     04-19  Mg     2.20     04-19    TPro  8.4<H>  /  Alb  4.8  /  TBili  0.3  /  DBili  x   /  AST  24  /  ALT  28  /  AlkPhos  165<H>  04-19              MICROBIOLOGY    IMAGING    CARDIOLOGY

## 2022-04-19 NOTE — ED ADULT NURSE NOTE - OBJECTIVE STATEMENT
Patient is a 81 year old female, A&OX3, ambulatory with assist Phx of CHF, CAD, HTN coming in for multiple medical complaints c/o yeast infection, urinary symptoms, decreased appetite, weakness, high BP and high BGL over last week. 20 G placed in L AC. Labs sent. Family at bedside. Safety maintained. Will continue to monitor.

## 2022-04-19 NOTE — ED PROVIDER NOTE - OBJECTIVE STATEMENT
81 year old female PMH pacemaker, endometrial adenocarcinoma s/p hysterectomy in Jan and radiation 1 wks ago, HTN, GERD, CHF presents today with nausea, loss of appetite, mouth dry asnd bitter, and acid reflux. Went to PMD initially found to be hyperglycemic to 598 and hypertensive. Metoprolol 25mg increased to 50mg bid. Eating poorly.  Seen by GYN yesterday for UTI and found to have a bladder infection. Started on Cipro and cephalexin.

## 2022-04-19 NOTE — ED PROVIDER NOTE - CLINICAL SUMMARY MEDICAL DECISION MAKING FREE TEXT BOX
81 year old female PMH pacemaker, endometrial adenocarcinoma s/p hysterectomy in Jan and radiation 1 wks ago, HTN, GERD, CHF presents today with nausea, loss of appetite, mouth dry asnd bitter, and acid reflux. C/f hyperglycemia vs new onset DKA could be provoked by UTI currently undergoing tx for. Plan to obtain labs, xr, urine, give ivf, reassess. 07-Apr-2022 00:11

## 2022-04-19 NOTE — ED PROVIDER NOTE - ATTENDING CONTRIBUTION TO CARE
I have personally performed a face to face medical and diagnostic evaluation of the patient. I have discussed with and reviewed the Resident's note and agree with the History, ROS, Physical Exam and MDM unless otherwise indicated. A brief summary of my personal evaluation and impression can be found below.    80yo F hx pacemaker, endomedtrial adenoCA s/p hysterectomy and radiation, htn, gerd, p/w nausea, loss of appetite, dry mouth, inc thirst and bitter taste in mouth and abd burning similar to reflux in past. Recent dx of uti started on cipro/keflex  VITALS: Initial triage and subsequent vitals have been reviewed by me.  Gen: Well appearing, NAD, alert, non-toxic  Head: NCAT  HEENT: dry mouth, normal conjunctiva, anicteric, neck supple  Lung: CTAB, no adventitious sounds  CV: RRR, no murmurs, 2+symmetric peripheral pulses  Abd: soft, NTND, no rebound or guarding, no palpable masses  MSK: No edema, no visible deformities  Neuro: Moving all extremity grossly, following commands appropriately, fluid speech  Skin: Warm and dry, no evidence of rash  Psych: normal mood and affect  Hypergylcemia in setting of recent uti w/ signs/symptoms of hypgylcemia. Will check labs, ivf, r/o dka and anticipate cdu v admission for further glycemic control

## 2022-04-19 NOTE — H&P ADULT - NSHPREVIEWOFSYSTEMS_GEN_ALL_CORE
REVIEW OF SYSTEMS:  CONSTITUTIONAL: No weakness, fevers or chills  EYES/ENT: No visual changes;  No vertigo or throat pain   NECK: No pain or stiffness  RESPIRATORY: No cough, wheezing, hemoptysis; No shortness of breath  CARDIOVASCULAR: No chest pain or palpitations  GASTROINTESTINAL: No abdominal or epigastric pain. No nausea, vomiting, or hematemesis; No diarrhea or constipation. No melena or hematochezia.  GENITOURINARY: No dysuria, frequency or hematuria  NEUROLOGICAL: No numbness or weakness  SKIN: No itching, rashes REVIEW OF SYSTEMS:  CONSTITUTIONAL: +weakness, fevers or chills  EYES/ENT: No visual changes;  No vertigo or throat pain   NECK: No pain or stiffness  RESPIRATORY: No cough, wheezing, hemoptysis; No shortness of breath  CARDIOVASCULAR: No chest pain or palpitations  GASTROINTESTINAL: No abdominal or epigastric pain. No nausea, vomiting, or hematemesis; No diarrhea or constipation. No melena or hematochezia.  GENITOURINARY: +dysuria, frequency or no hematuria  NEUROLOGICAL: No numbness or weakness  SKIN: No itching, rashes

## 2022-04-19 NOTE — H&P ADULT - PROBLEM SELECTOR PLAN 4
- obtain echo  - Gentle IVF in setting of likely DKA - obtain echo  - Gentle IVF in setting of likely DKA  - HOLD lasix and spironolactone while hospitalized with likely DKA

## 2022-04-20 ENCOUNTER — TRANSCRIPTION ENCOUNTER (OUTPATIENT)
Age: 82
End: 2022-04-20

## 2022-04-20 LAB
ALBUMIN SERPL ELPH-MCNC: 3.5 G/DL — SIGNIFICANT CHANGE UP (ref 3.3–5)
ALP SERPL-CCNC: 114 U/L — SIGNIFICANT CHANGE UP (ref 40–120)
ALT FLD-CCNC: 26 U/L — SIGNIFICANT CHANGE UP (ref 4–33)
ANION GAP SERPL CALC-SCNC: 12 MMOL/L — SIGNIFICANT CHANGE UP (ref 7–14)
AST SERPL-CCNC: 26 U/L — SIGNIFICANT CHANGE UP (ref 4–32)
B-OH-BUTYR SERPL-SCNC: 0.6 MMOL/L — HIGH (ref 0–0.4)
BASOPHILS # BLD AUTO: 0.05 K/UL — SIGNIFICANT CHANGE UP (ref 0–0.2)
BASOPHILS NFR BLD AUTO: 0.7 % — SIGNIFICANT CHANGE UP (ref 0–2)
BILIRUB SERPL-MCNC: 0.2 MG/DL — SIGNIFICANT CHANGE UP (ref 0.2–1.2)
BUN SERPL-MCNC: 10 MG/DL — SIGNIFICANT CHANGE UP (ref 7–23)
CALCIUM SERPL-MCNC: 8.7 MG/DL — SIGNIFICANT CHANGE UP (ref 8.4–10.5)
CHLORIDE SERPL-SCNC: 98 MMOL/L — SIGNIFICANT CHANGE UP (ref 98–107)
CO2 SERPL-SCNC: 24 MMOL/L — SIGNIFICANT CHANGE UP (ref 22–31)
CREAT SERPL-MCNC: 0.49 MG/DL — LOW (ref 0.5–1.3)
CULTURE RESULTS: NO GROWTH — SIGNIFICANT CHANGE UP
EGFR: 94 ML/MIN/1.73M2 — SIGNIFICANT CHANGE UP
EOSINOPHIL # BLD AUTO: 0.18 K/UL — SIGNIFICANT CHANGE UP (ref 0–0.5)
EOSINOPHIL NFR BLD AUTO: 2.3 % — SIGNIFICANT CHANGE UP (ref 0–6)
GLUCOSE BLDC GLUCOMTR-MCNC: 196 MG/DL — HIGH (ref 70–99)
GLUCOSE BLDC GLUCOMTR-MCNC: 212 MG/DL — HIGH (ref 70–99)
GLUCOSE BLDC GLUCOMTR-MCNC: 215 MG/DL — HIGH (ref 70–99)
GLUCOSE BLDC GLUCOMTR-MCNC: 222 MG/DL — HIGH (ref 70–99)
GLUCOSE SERPL-MCNC: 226 MG/DL — HIGH (ref 70–99)
HCT VFR BLD CALC: 39.3 % — SIGNIFICANT CHANGE UP (ref 34.5–45)
HGB BLD-MCNC: 13.2 G/DL — SIGNIFICANT CHANGE UP (ref 11.5–15.5)
IANC: 4.55 K/UL — SIGNIFICANT CHANGE UP (ref 1.8–7.4)
IMM GRANULOCYTES NFR BLD AUTO: 0.3 % — SIGNIFICANT CHANGE UP (ref 0–1.5)
LYMPHOCYTES # BLD AUTO: 2.38 K/UL — SIGNIFICANT CHANGE UP (ref 1–3.3)
LYMPHOCYTES # BLD AUTO: 30.9 % — SIGNIFICANT CHANGE UP (ref 13–44)
MAGNESIUM SERPL-MCNC: 2 MG/DL — SIGNIFICANT CHANGE UP (ref 1.6–2.6)
MCHC RBC-ENTMCNC: 28.5 PG — SIGNIFICANT CHANGE UP (ref 27–34)
MCHC RBC-ENTMCNC: 33.6 GM/DL — SIGNIFICANT CHANGE UP (ref 32–36)
MCV RBC AUTO: 84.9 FL — SIGNIFICANT CHANGE UP (ref 80–100)
MONOCYTES # BLD AUTO: 0.51 K/UL — SIGNIFICANT CHANGE UP (ref 0–0.9)
MONOCYTES NFR BLD AUTO: 6.6 % — SIGNIFICANT CHANGE UP (ref 2–14)
NEUTROPHILS # BLD AUTO: 4.55 K/UL — SIGNIFICANT CHANGE UP (ref 1.8–7.4)
NEUTROPHILS NFR BLD AUTO: 59.2 % — SIGNIFICANT CHANGE UP (ref 43–77)
NRBC # BLD: 0 /100 WBCS — SIGNIFICANT CHANGE UP
NRBC # FLD: 0 K/UL — SIGNIFICANT CHANGE UP
PHOSPHATE SERPL-MCNC: 3.1 MG/DL — SIGNIFICANT CHANGE UP (ref 2.5–4.5)
PLATELET # BLD AUTO: 177 K/UL — SIGNIFICANT CHANGE UP (ref 150–400)
POTASSIUM SERPL-MCNC: 3.5 MMOL/L — SIGNIFICANT CHANGE UP (ref 3.5–5.3)
POTASSIUM SERPL-SCNC: 3.5 MMOL/L — SIGNIFICANT CHANGE UP (ref 3.5–5.3)
PROT SERPL-MCNC: 6.2 G/DL — SIGNIFICANT CHANGE UP (ref 6–8.3)
RBC # BLD: 4.63 M/UL — SIGNIFICANT CHANGE UP (ref 3.8–5.2)
RBC # FLD: 13.6 % — SIGNIFICANT CHANGE UP (ref 10.3–14.5)
SODIUM SERPL-SCNC: 134 MMOL/L — LOW (ref 135–145)
SPECIMEN SOURCE: SIGNIFICANT CHANGE UP
WBC # BLD: 7.69 K/UL — SIGNIFICANT CHANGE UP (ref 3.8–10.5)
WBC # FLD AUTO: 7.69 K/UL — SIGNIFICANT CHANGE UP (ref 3.8–10.5)

## 2022-04-20 PROCEDURE — 93306 TTE W/DOPPLER COMPLETE: CPT | Mod: 26

## 2022-04-20 PROCEDURE — 99233 SBSQ HOSP IP/OBS HIGH 50: CPT | Mod: GC

## 2022-04-20 PROCEDURE — 99223 1ST HOSP IP/OBS HIGH 75: CPT

## 2022-04-20 RX ORDER — DEXTROSE 50 % IN WATER 50 %
12.5 SYRINGE (ML) INTRAVENOUS ONCE
Refills: 0 | Status: DISCONTINUED | OUTPATIENT
Start: 2022-04-20 | End: 2022-04-23

## 2022-04-20 RX ORDER — INSULIN LISPRO 100/ML
VIAL (ML) SUBCUTANEOUS
Refills: 0 | Status: DISCONTINUED | OUTPATIENT
Start: 2022-04-20 | End: 2022-04-21

## 2022-04-20 RX ORDER — DEXTROSE 50 % IN WATER 50 %
15 SYRINGE (ML) INTRAVENOUS ONCE
Refills: 0 | Status: DISCONTINUED | OUTPATIENT
Start: 2022-04-20 | End: 2022-04-23

## 2022-04-20 RX ORDER — ENOXAPARIN SODIUM 100 MG/ML
40 INJECTION SUBCUTANEOUS EVERY 24 HOURS
Refills: 0 | Status: DISCONTINUED | OUTPATIENT
Start: 2022-04-21 | End: 2022-04-23

## 2022-04-20 RX ORDER — GLUCAGON INJECTION, SOLUTION 0.5 MG/.1ML
1 INJECTION, SOLUTION SUBCUTANEOUS ONCE
Refills: 0 | Status: DISCONTINUED | OUTPATIENT
Start: 2022-04-20 | End: 2022-04-23

## 2022-04-20 RX ORDER — SODIUM CHLORIDE 9 MG/ML
1000 INJECTION, SOLUTION INTRAVENOUS
Refills: 0 | Status: DISCONTINUED | OUTPATIENT
Start: 2022-04-20 | End: 2022-04-23

## 2022-04-20 RX ORDER — NYSTATIN/TRIAMCINOLONE ACET
1 OINTMENT (GRAM) TOPICAL
Refills: 0 | Status: DISCONTINUED | OUTPATIENT
Start: 2022-04-20 | End: 2022-04-23

## 2022-04-20 RX ORDER — SALIVA SUBSTITUTE COMB NO.11 351 MG
15 POWDER IN PACKET (EA) MUCOUS MEMBRANE
Refills: 0 | Status: DISCONTINUED | OUTPATIENT
Start: 2022-04-20 | End: 2022-04-23

## 2022-04-20 RX ORDER — DEXTROSE 50 % IN WATER 50 %
25 SYRINGE (ML) INTRAVENOUS ONCE
Refills: 0 | Status: DISCONTINUED | OUTPATIENT
Start: 2022-04-20 | End: 2022-04-23

## 2022-04-20 RX ORDER — PETROLATUM,WHITE
1 JELLY (GRAM) TOPICAL
Refills: 0 | Status: DISCONTINUED | OUTPATIENT
Start: 2022-04-20 | End: 2022-04-23

## 2022-04-20 RX ORDER — INSULIN LISPRO 100/ML
5 VIAL (ML) SUBCUTANEOUS
Refills: 0 | Status: DISCONTINUED | OUTPATIENT
Start: 2022-04-20 | End: 2022-04-21

## 2022-04-20 RX ORDER — INSULIN GLARGINE 100 [IU]/ML
15 INJECTION, SOLUTION SUBCUTANEOUS AT BEDTIME
Refills: 0 | Status: DISCONTINUED | OUTPATIENT
Start: 2022-04-20 | End: 2022-04-21

## 2022-04-20 RX ORDER — POTASSIUM CHLORIDE 20 MEQ
40 PACKET (EA) ORAL ONCE
Refills: 0 | Status: COMPLETED | OUTPATIENT
Start: 2022-04-20 | End: 2022-04-20

## 2022-04-20 RX ORDER — INSULIN LISPRO 100/ML
VIAL (ML) SUBCUTANEOUS AT BEDTIME
Refills: 0 | Status: DISCONTINUED | OUTPATIENT
Start: 2022-04-20 | End: 2022-04-21

## 2022-04-20 RX ADMIN — Medication 5 UNIT(S): at 18:08

## 2022-04-20 RX ADMIN — Medication 5 UNIT(S): at 12:46

## 2022-04-20 RX ADMIN — CEFTRIAXONE 100 MILLIGRAM(S): 500 INJECTION, POWDER, FOR SOLUTION INTRAMUSCULAR; INTRAVENOUS at 20:26

## 2022-04-20 RX ADMIN — INSULIN GLARGINE 15 UNIT(S): 100 INJECTION, SOLUTION SUBCUTANEOUS at 21:45

## 2022-04-20 RX ADMIN — Medication 81 MILLIGRAM(S): at 12:47

## 2022-04-20 RX ADMIN — Medication 4: at 06:07

## 2022-04-20 RX ADMIN — ENOXAPARIN SODIUM 40 MILLIGRAM(S): 100 INJECTION SUBCUTANEOUS at 06:07

## 2022-04-20 RX ADMIN — PANTOPRAZOLE SODIUM 40 MILLIGRAM(S): 20 TABLET, DELAYED RELEASE ORAL at 06:06

## 2022-04-20 RX ADMIN — Medication 40 MILLIEQUIVALENT(S): at 09:26

## 2022-04-20 RX ADMIN — Medication 2: at 21:43

## 2022-04-20 RX ADMIN — Medication 3: at 18:08

## 2022-04-20 RX ADMIN — Medication 50 MILLIGRAM(S): at 06:09

## 2022-04-20 RX ADMIN — Medication 2: at 12:46

## 2022-04-20 NOTE — PATIENT PROFILE ADULT - FALL HARM RISK - HARM RISK INTERVENTIONS
Assistance with ambulation/Assistance OOB with selected safe patient handling equipment/Communicate Risk of Fall with Harm to all staff/Monitor gait and stability/Reinforce activity limits and safety measures with patient and family/Review medications for side effects contributing to fall risk/Sit up slowly, dangle for a short time, stand at bedside before walking/Tailored Fall Risk Interventions/Use of alarms - bed, chair and/or voice tab/Visual Cue: Yellow wristband and red socks/Bed in lowest position, wheels locked, appropriate side rails in place/Call bell, personal items and telephone in reach/Instruct patient to call for assistance before getting out of bed or chair/Non-slip footwear when patient is out of bed/[place] to call system/Physically safe environment - no spills, clutter or unnecessary equipment/Purposeful Proactive Rounding/Room/bathroom lighting operational, light cord in reach Assistance with ambulation/Assistance OOB with selected safe patient handling equipment/Communicate Risk of Fall with Harm to all staff/Monitor gait and stability/Reinforce activity limits and safety measures with patient and family/Tailored Fall Risk Interventions/Toileting schedule using arm’s reach rule for commode and bathroom/Visual Cue: Yellow wristband and red socks/Bed in lowest position, wheels locked, appropriate side rails in place/Call bell, personal items and telephone in reach/Instruct patient to call for assistance before getting out of bed or chair/Non-slip footwear when patient is out of bed/[place] to call system/Physically safe environment - no spills, clutter or unnecessary equipment/Purposeful Proactive Rounding/Room/bathroom lighting operational, light cord in reach

## 2022-04-20 NOTE — PROGRESS NOTE ADULT - PROBLEM SELECTOR PLAN 1
- Patient presenting with likely newly diagnosed DM2, hyperosmolar nonketotic state (HHS)  - In ED: AG elevated 17, beta-hydroxybutyrate 0.7, a1c 11.1  - In ED: Glucose 550 -> 330 s/p 6U insulin  - START Lantus 15U bedtime, obtain official weight  - IVF NS 75cc/hr in setting of CHF  - Endo consult in AM  - Trend Lytes and AG, BHB  - NPO for now but can drink water, moderate ISS  - Repeat BMP - Patient presenting with likely newly diagnosed DM2, hyperosmolar nonketotic state (HHS)  - In ED: AG elevated 17, beta-hydroxybutyrate 0.7, a1c 11.1  - In ED: Glucose 550 -> 330 s/p 6U insulin  - s/p Lantus 15U bedtime, obtain official weight  - s/p IVF NS 75cc/hr in setting of CHF  - Endo consulted - will see patient 4/20 - will appreciate insulin recs  - Trend Lytes and AG, BHB  - NPO for now but can drink water, moderate ISS  - Repeat BMP

## 2022-04-20 NOTE — DISCHARGE NOTE PROVIDER - NSDCFUSCHEDAPPT_GEN_ALL_CORE_FT
ROSCOE VIEYRA ; 05/13/2022 ; NPP [address] ROSCOE VIEYRA ; 05/09/2022 ; NP Med Endocr [address] [facility]  ROSCOE VIEYRA ; 05/13/2022 ; NPP Rad Med [address] [facility]

## 2022-04-20 NOTE — DISCHARGE NOTE PROVIDER - NSFOLLOWUPCLINICS_GEN_ALL_ED_FT
[facility] Endocrinology  Endocrinology  [address] [place] [address]  Phone: [phone]  Fax:   Established Patient  Follow Up Time: 2 weeks

## 2022-04-20 NOTE — PROGRESS NOTE ADULT - SUBJECTIVE AND OBJECTIVE BOX
Patient:  ROSCOE VIEYRA  [id number]    Progress Note    Interval events: No acute events.  Pertinent ROS (if any):      Administered:  metoprolol succinate ER: 50 milliGRAM(s) Oral ( @ 06:09)  insulin lispro (ADMELOG) corrective regimen sliding scale: 4 Unit(s) SubCutaneous ( @ 06:07)  enoxaparin Injectable: 40 milliGRAM(s) SubCutaneous ( @ 06:07)  pantoprazole    Tablet: 40 milliGRAM(s) Oral ( @ 06:06)  insulin lispro (ADMELOG) corrective regimen sliding scale: 4 Unit(s) SubCutaneous ( @ 23:29)  insulin glargine Injectable (LANTUS): 15 Unit(s) SubCutaneous ( @ 20:53)  fluconAZOLE   Tablet: 150 milliGRAM(s) Oral ( @ 20:53)  cefTRIAXone   IVPB: 1000 milliGRAM(s) IV Intermittent ( 20:53)        OBJECTIVE:    CAPILLARY BLOOD GLUCOSE      POCT Blood Glucose.: 212 mg/dL (2022 06:04)        VITALS:  T(F): 97.5 (22 @ 04:58), Max: 98.3 (22 @ 02:25)  HR: 70 (22 @ 05:55) (63 - 78)  BP: 147/62 (22 @ 05:55) (139/65 - 158/78)  BP(mean): --  ABP: --  ABP(mean): --  RR: 15 (22 @ 04:58) (15 - 18)  SpO2: 97% (22 @ 04:58) (96% - 99%)    PHYSICAL EXAM:  GENERAL: NAD, lying in bed comfortably  HEAD:  Atraumatic, Normocephalic  EYES: EOMI, PERRLA, conjunctiva and sclera clear  ENT: Moist mucous membranes  NECK: Supple, No JVD  CHEST/LUNG: Clear to auscultation bilaterally; No rales, rhonchi, wheezing, or rubs. Unlabored respirations  HEART: Regular rate and rhythm; No murmurs, rubs, or gallops  ABDOMEN: Bowel sounds present; Soft, Nontender, Nondistended. No hepatomegaly  EXTREMITIES:  2+ Peripheral Pulses, brisk capillary refill. No clubbing, cyanosis, or edema  NERVOUS SYSTEM:  Alert & Oriented X3, speech clear. No deficits   MSK: FROM all 4 extremities, full and equal strength  SKIN: No rashes or lesions    HOSPITAL MEDICATIONS:  Standing Meds:  aspirin enteric coated 81 milliGRAM(s) Oral daily  cefTRIAXone   IVPB      cefTRIAXone   IVPB 1000 milliGRAM(s) IV Intermittent every 24 hours  dextrose 50% Injectable 25 Gram(s) IV Push once  dextrose 50% Injectable 12.5 Gram(s) IV Push once  dextrose 50% Injectable 25 Gram(s) IV Push once  dextrose Oral Gel 15 Gram(s) Oral once  enoxaparin Injectable 40 milliGRAM(s) SubCutaneous every 12 hours  glucagon  Injectable 1 milliGRAM(s) IntraMuscular once  insulin lispro (ADMELOG) corrective regimen sliding scale   SubCutaneous every 6 hours  metoprolol succinate ER 50 milliGRAM(s) Oral daily  pantoprazole    Tablet 40 milliGRAM(s) Oral before breakfast  sodium chloride 0.9%. 1000 milliLiter(s) IV Continuous <Continuous>      PRN Meds:      LABS:  CBC 22 @ 15:28                        16.0   8.68  )-----------( 233                   47.2       Hgb trend: 16.0 <--   WBC trend: 8.68 <--       CMP 22 @ 15:28    132<L>  |  91<L>  |  17  ----------------------------<  491<HH>  4.9   |  24  |  0.65    Ca    9.9      22 @ 15:28  Phos  3.5       Mg     2.20         TPro  8.4<H>  /  Alb  4.8  /  TBili  0.3  /  DBili  x   /  AST  24  /  ALT  28  /  AlkPhos  165<H>        Serum Cr trend: 0.65 <--       Urinalysis Basic - ( 2022 21:03 )    Color: Yellow / Appearance: Clear / S.039 / pH: x  Gluc: x / Ketone: Trace  / Bili: Negative / Urobili: <2 mg/dL   Blood: x / Protein: 30 mg/dL / Nitrite: Negative   Leuk Esterase: Large / RBC: 4 /HPF / WBC 89 /HPF   Sq Epi: x / Non Sq Epi: 3 /HPF / Bacteria: Negative      ABG Trend:     VBG Trend:   22 @ 17:10 - pH: 7.35  | pCO2: 54    | pO2: 36    | HCO3: 30    | Lactate: 1.6    22 @ 15:28 - pH: 7.36  | pCO2: 51    | pO2: 39    | HCO3: 29    | Lactate: 2.2        MICROBIOLOGY:       RADIOLOGY:  [ ] Reviewed and interpreted by me    EKG:       Patient:  ROSCOE VIEYRA  [id number]    Progress Note    Interval events: No acute events. Pt seen at bedside with daughter present. AAOx3. Endorses her mouth feels dry. Also endorses continued genital itching. Denies SOB. No chest pain. Denies leg swelling.   Pertinent ROS (if any):      Administered:  metoprolol succinate ER: 50 milliGRAM(s) Oral ( @ 06:09)  insulin lispro (ADMELOG) corrective regimen sliding scale: 4 Unit(s) SubCutaneous ( @ 06:07)  enoxaparin Injectable: 40 milliGRAM(s) SubCutaneous ( @ 06:07)  pantoprazole    Tablet: 40 milliGRAM(s) Oral ( @ 06:06)  insulin lispro (ADMELOG) corrective regimen sliding scale: 4 Unit(s) SubCutaneous ( @ 23:29)  insulin glargine Injectable (LANTUS): 15 Unit(s) SubCutaneous ( @ 20:53)  fluconAZOLE   Tablet: 150 milliGRAM(s) Oral ( @ 20:53)  cefTRIAXone   IVPB: 1000 milliGRAM(s) IV Intermittent ( @ 20:53)        OBJECTIVE:    CAPILLARY BLOOD GLUCOSE      POCT Blood Glucose.: 212 mg/dL (2022 06:04)        VITALS:  T(F): 97.5 (22 @ 04:58), Max: 98.3 (22 @ 02:25)  HR: 70 (22 @ 05:55) (63 - 78)  BP: 147/62 (22 @ 05:55) (139/65 - 158/78)  BP(mean): --  ABP: --  ABP(mean): --  RR: 15 (22 @ 04:58) (15 - 18)  SpO2: 97% (22 @ 04:58) (96% - 99%)    PHYSICAL EXAM:  GENERAL: NAD, lying in bed comfortably  HEAD:  Atraumatic, Normocephalic  EYES: EOMI, PERRLA, conjunctiva and sclera clear  ENT: Moist mucous membranes  NECK: Supple, No JVD  CHEST/LUNG: Clear to auscultation bilaterally; No rales, rhonchi, wheezing, or rubs. Unlabored respirations  HEART: Regular rate and rhythm; No murmurs, rubs, or gallops  ABDOMEN: Bowel sounds present; Soft, Nontender, Nondistended. No hepatomegaly  EXTREMITIES:  2+ Peripheral Pulses, brisk capillary refill. No clubbing, cyanosis, or edema  NERVOUS SYSTEM:  Alert & Oriented X3, speech clear. No deficits   MSK: FROM all 4 extremities, full and equal strength  SKIN: No rashes or lesions    HOSPITAL MEDICATIONS:  Standing Meds:  aspirin enteric coated 81 milliGRAM(s) Oral daily  cefTRIAXone   IVPB      cefTRIAXone   IVPB 1000 milliGRAM(s) IV Intermittent every 24 hours  dextrose 50% Injectable 25 Gram(s) IV Push once  dextrose 50% Injectable 12.5 Gram(s) IV Push once  dextrose 50% Injectable 25 Gram(s) IV Push once  dextrose Oral Gel 15 Gram(s) Oral once  enoxaparin Injectable 40 milliGRAM(s) SubCutaneous every 12 hours  glucagon  Injectable 1 milliGRAM(s) IntraMuscular once  insulin lispro (ADMELOG) corrective regimen sliding scale   SubCutaneous every 6 hours  metoprolol succinate ER 50 milliGRAM(s) Oral daily  pantoprazole    Tablet 40 milliGRAM(s) Oral before breakfast  sodium chloride 0.9%. 1000 milliLiter(s) IV Continuous <Continuous>      PRN Meds:      LABS:  CBC 22 @ 15:28                        16.0   8.68  )-----------( 233                   47.2       Hgb trend: 16.0 <--   WBC trend: 8.68 <--       CMP 22 @ 15:28    132<L>  |  91<L>  |  17  ----------------------------<  491<HH>  4.9   |  24  |  0.65    Ca    9.9      22 @ 15:28  Phos  3.5       Mg     2.20         TPro  8.4<H>  /  Alb  4.8  /  TBili  0.3  /  DBili  x   /  AST  24  /  ALT  28  /  AlkPhos  165<H>        Serum Cr trend: 0.65 <--       Urinalysis Basic - ( 2022 21:03 )    Color: Yellow / Appearance: Clear / S.039 / pH: x  Gluc: x / Ketone: Trace  / Bili: Negative / Urobili: <2 mg/dL   Blood: x / Protein: 30 mg/dL / Nitrite: Negative   Leuk Esterase: Large / RBC: 4 /HPF / WBC 89 /HPF   Sq Epi: x / Non Sq Epi: 3 /HPF / Bacteria: Negative      ABG Trend:     VBG Trend:   22 @ 17:10 - pH: 7.35  | pCO2: 54    | pO2: 36    | HCO3: 30    | Lactate: 1.6    22 @ 15:28 - pH: 7.36  | pCO2: 51    | pO2: 39    | HCO3: 29    | Lactate: 2.2        MICROBIOLOGY:       RADIOLOGY:  [ ] Reviewed and interpreted by me    EKG:

## 2022-04-20 NOTE — PROGRESS NOTE ADULT - PROBLEM SELECTOR PLAN 4
- obtain echo  - Gentle IVF in setting of likely DKA  - HOLD lasix and spironolactone while hospitalized with likely DKA

## 2022-04-20 NOTE — PHYSICAL THERAPY INITIAL EVALUATION ADULT - GENERAL OBSERVATIONS, REHAB EVAL
Patient received seated at edge of bed, A+Ox4, comfortable and in NAD. Patient agreeable to participate in PT evaluation.

## 2022-04-20 NOTE — DISCHARGE NOTE PROVIDER - HOSPITAL COURSE
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 Tee, Sean Tinoco), c/b heart block s/p dual chamber PPM 1/12/22, HTN, GERD, endometrial cancer s/p LEVY in 1/2022, s/p radiation a week ago, p/w nausea, decreased appetite, dry mouth, acid reflux and dysuria/vaginal itching for about a week. Patient states she went to gynecologist on 4/18 for UTI and was prescribed cipro and cephalexin. She was also found to have vaginal candidiasis and prescribed 5 day course of clotrimazole and betamethasone. On 4/19, she went to her PCP, who found patient to be hyperglycemic to 598 and hypertensive. Metoprolol increased from 25 to 50 mg by PCP. Denies h/o diabetes or insulin use or recent steroid use per son/daughter at bedside. In ED, found to have blood glucose 400-500's, given insulin 6U and 2L NS bolus. Pt started on Lantus 15U at bedtime.    At admission, patient endorsed weakness, pain with urination, dry mouth, and genital itching. Physical exam was unremarkable. Labs were significant for hyperglycemia to 200s and 300s and increased beta-hydroxybutyrate to 0.7. Potassium of 4.9 Anion gap was normal. A1C of 11.1 UA performed which showed large leukocyte esterase concentration, white blood cells, and glycosuria >=1000. Patient received 1 dose of Diflucan for vaginal candidiasis. Urine cultures were sent. Home Lasix and spironolactone were held. Potassium was repleted given setting of exogenous insulin administration. Endocrine was consulted, who recommended low-dose sliding scale before meals, low-dose sliding scale at bedtime, and 5 units Admelog pre-meal. Patient and family met with the diabetes educator about future outpatient management of her diabetes. Patient received 3 doses IV ceftriaxone for continued genitourinary infection.     By time of discharge, patient was feeling improved from time of admission. She endorsed improved weakness and decreased pain with urination. Glucose was in 100s by time of discharge with downtrendeding of beta hydroxy-butyrate. Patient was determined to be clinically and medically optimized. The patient was determined to be medically optimized, with plan to follow up with PCP outpatient for diabetes management. Family and patient were on-board with plan. 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 Tee, Sean Tinoco), c/b heart block s/p dual chamber PPM 1/12/22, HTN, GERD, endometrial cancer s/p LEVY in 1/2022, s/p radiation a week ago, p/w nausea, decreased appetite, dry mouth, acid reflux and dysuria/vaginal itching for about a week. Patient states she went to gynecologist on 4/18 for UTI and was prescribed cipro and cephalexin. She was also found to have vaginal candidiasis and prescribed 5 day course of clotrimazole and betamethasone. On 4/19, she went to her PCP, who found patient to be hyperglycemic to 598 and hypertensive. Metoprolol increased from 25 to 50 mg by PCP. Denies h/o diabetes or insulin use or recent steroid use per son/daughter at bedside. In ED, found to have blood glucose 400-500's, given insulin 6U and 2L NS bolus. Pt started on Lantus 15U at bedtime.    At admission, patient endorsed weakness, pain with urination, dry mouth, and genital itching. Physical exam was unremarkable. Labs were significant for hyperglycemia to 200s and 300s and increased beta-hydroxybutyrate to 0.7. Potassium of 4.9 Anion gap was normal. A1C of 11.1 UA performed which showed large leukocyte esterase concentration, white blood cells, and glycosuria >=1000. Patient received 1 dose of Diflucan for vaginal candidiasis. Urine cultures were sent. Home Lasix and spironolactone were held. Potassium was repleted given setting of exogenous insulin administration. Endocrine was consulted, who recommended low-dose sliding scale before meals, low-dose sliding scale at bedtime, and 5 units Admelog pre-meal. Patient and family met with the diabetes educator about future outpatient management of her diabetes. Patient received 3 doses IV ceftriaxone for continued genitourinary infection. TTE was performed in setting of PMHx of CHF, which was normal.    By time of discharge, patient was feeling improved from time of admission. She endorsed improved weakness and decreased pain with urination. Glucose was in 100s by time of discharge with downtrendeding of beta hydroxy-butyrate. Patient was determined to be clinically and medically optimized. The patient was determined to be medically optimized, with plan to follow up with PCP outpatient for diabetes management. Family and patient were on-board with plan. 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 Tee, Sean Tinoco), c/b heart block s/p dual chamber PPM 1/12/22, HTN, GERD, endometrial cancer s/p LEVY in 1/2022, s/p radiation a week ago, p/w nausea, decreased appetite, dry mouth, acid reflux and dysuria/vaginal itching for about a week. Patient states she went to gynecologist on 4/18 for UTI and was prescribed cipro and cephalexin. She was also found to have vaginal candidiasis and prescribed 5 day course of clotrimazole and betamethasone. On 4/19, she went to her PCP, who found patient to be hyperglycemic to 598 and hypertensive. Metoprolol increased from 25 to 50 mg by PCP. Denies h/o diabetes or insulin use or recent steroid use per son/daughter at bedside. In ED, found to have blood glucose 400-500's, given insulin 6U and 2L NS bolus. Pt started on Lantus 15U at bedtime.    At admission, patient endorsed weakness, pain with urination, dry mouth, and genital itching. Physical exam was unremarkable. Labs were significant for hyperglycemia to 200s and 300s and increased beta-hydroxybutyrate to 0.7. Potassium of 4.9 Anion gap was normal. A1C of 11.1 UA performed which showed large leukocyte esterase concentration, white blood cells, and glycosuria >=1000. Patient received 1 dose of Diflucan for vaginal candidiasis. Urine cultures were sent. Home Lasix and spironolactone were held. Potassium was repleted given setting of exogenous insulin administration. Endocrine was consulted, who recommended low-dose sliding scale before meals, low-dose sliding scale at bedtime, and 5 units Admelog pre-meal. Patient and family met with the diabetes educator about future outpatient management of her diabetes. Patient received 3 doses IV ceftriaxone for continued genitourinary infection. TTE was performed in setting of PMHx of CHF, which was normal.    By time of discharge, patient was feeling improved from time of admission. She endorsed improved weakness and decreased pain with urination. Glucose was in 100s by time of discharge with downtrendeding of beta hydroxy-butyrate. Patient started on lisinopril 10 mg qD and will follow-up with PCP for BMP/BP reading after discharge. Patient was determined to be clinically and medically optimized. The patient was determined to be medically optimized, with plan to follow up with PCP outpatient for diabetes management. Family and patient were on-board with plan. 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 Tee, Sean Tinoco), c/b heart block s/p dual chamber PPM 1/12/22, HTN, GERD, endometrial cancer s/p LEVY in 1/2022, s/p radiation a week ago, p/w nausea, decreased appetite, dry mouth, acid reflux and dysuria/vaginal itching for about a week. Patient states she went to gynecologist on 4/18 for UTI and was prescribed cipro and cephalexin. She was also found to have vaginal candidiasis and prescribed 5 day course of clotrimazole and betamethasone. On 4/19, she went to her PCP, who found patient to be hyperglycemic to 598 and hypertensive. Metoprolol increased from 25 to 50 mg by PCP. Denies h/o diabetes or insulin use or recent steroid use per son/daughter at bedside. In ED, found to have blood glucose 400-500's, given insulin 6U and 2L NS bolus. Pt started on Lantus 15U at bedtime.    At admission, patient endorsed weakness, pain with urination, dry mouth, and genital itching. Physical exam was unremarkable. Labs were significant for hyperglycemia to 200s and 300s and increased beta-hydroxybutyrate to 0.7. Potassium of 4.9 Anion gap was normal. A1C of 11.1 UA performed which showed large leukocyte esterase concentration, white blood cells, and glycosuria >=1000. Patient received 2 doses of Diflucan for vaginal candidiasis. Urine cultures were sent. Home Lasix and spironolactone were held. Potassium was repleted given setting of exogenous insulin administration. Endocrine was consulted, who recommended low-dose sliding scale before meals, low-dose sliding scale at bedtime, and 5 units Admelog pre-meal. Patient and family met with the diabetes educator about future outpatient management of her diabetes. Patient received 3 doses IV ceftriaxone for continued genitourinary infection. TTE was performed in setting of PMHx of CHF, which was normal.    By time of discharge, patient was feeling improved from time of admission. She endorsed improved weakness and decreased pain with urination. Glucose was in 100s by time of discharge with downtrendeding of beta hydroxy-butyrate. Patient started on lisinopril 10 mg qD and will follow-up with PCP for BMP/BP reading after discharge. Patient was determined to be clinically and medically optimized. The patient was determined to be medically optimized, with plan to follow up with PCP outpatient for diabetes management. Family and patient were on-board with plan and outpatient appointments were scheduled for May 9 with endocrine nurse and August 25 with Dr. Araujo @ [address] [place] [address] [address]. 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 Tee, Sean Tinoco), c/b heart block s/p dual chamber PPM 1/12/22, HTN, GERD, endometrial cancer s/p LEVY in 1/2022, s/p radiation a week ago, p/w nausea, decreased appetite, dry mouth, acid reflux and dysuria/vaginal itching for about a week. Patient states she went to gynecologist on 4/18 for UTI and was prescribed cipro and cephalexin. She was also found to have vaginal candidiasis and prescribed 5 day course of clotrimazole/betamethasone cream. On 4/19, she went to her PCP, who found patient to be hyperglycemic to 598 and hypertensive. Metoprolol increased from 25 to 50 mg by PCP. Denies h/o diabetes or insulin use or recent steroid use per son/daughter at bedside. In ED, found to have blood glucose 400-500's, given insulin 6U and 2L NS bolus. Pt started on Lantus 15U at bedtime.    At admission, patient endorsed weakness, pain with urination, dry mouth, and genital itching. Physical exam was unremarkable. Labs were significant for hyperglycemia to 200s and 300s and increased beta-hydroxybutyrate to 0.7. Potassium of 4.9 Anion gap was normal. A1C of 11.1 UA performed which showed large leukocyte esterase concentration, white blood cells, and glycosuria >=1000. Patient received 2 doses of Diflucan for vaginal candidiasis. Urine cultures were sent. Home Lasix and spironolactone were held. Potassium was repleted given setting of exogenous insulin administration. Endocrine was consulted, who recommended low-dose sliding scale before meals, low-dose sliding scale at bedtime, and 8 units Admelog pre-meal. Patient and family met with the diabetes educator about future outpatient management of her diabetes. Patient received 3 day course of IV ceftriaxone for continued genitourinary infection. TTE was performed in setting of PMHx of CHF, which showed normal EF. GYN was consulted and recommended clotrimazole vaginal suppository x 3 days. Patient started and discharged on Nystatin triamcinolone cream BID for 2 weeks.      By time of discharge, patient was feeling improved from time of admission. She endorsed improved weakness and decreased pain with urination. Glucose was in 100s by time of discharge with downtrendeding of beta hydroxy-butyrate. Patient started on lisinopril 10 mg qD and will follow-up with PCP for BMP/BP reading after discharge. Patient was determined to be clinically and medically optimized. The patient was determined to be medically optimized, with plan to follow up with PCP outpatient for diabetes management. Family and patient were on-board with plan to discharge on basal lantus and metformin, and for outpatient appointments for May 9 with endocrine nurse and August 25 with Dr. Araujo @ [address] [place] [address] [address].     At this time, patient is medically optimized for discharge to home. 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 Tee, Sean Tinoco), c/b heart block s/p dual chamber PPM 1/12/22, HTN, GERD, endometrial cancer s/p LEVY in 1/2022, s/p radiation a week ago, p/w nausea, decreased appetite, dry mouth, acid reflux and dysuria/vaginal itching for about a week. Patient states she went to gynecologist on 4/18 for UTI and was prescribed cipro and cephalexin. She was also found to have vaginal candidiasis and prescribed 5 day course of clotrimazole/betamethasone cream. On 4/19, she went to her PCP, who found patient to be hyperglycemic to 598 and hypertensive. Metoprolol increased from 25 to 50 mg by PCP. Denies h/o diabetes or insulin use or recent steroid use per son/daughter at bedside. In ED, found to have blood glucose 400-500's, given insulin 6U and 2L NS bolus. Pt started on Lantus 15U at bedtime.    At admission, patient endorsed weakness, pain with urination, dry mouth, and genital itching. Physical exam was unremarkable. Labs were significant for hyperglycemia to 200s and 300s and increased beta-hydroxybutyrate to 0.7. Potassium of 4.9 Anion gap was normal. A1C of 11.1 UA performed which showed large leukocyte esterase concentration, white blood cells, and glycosuria >=1000. Patient received 2 doses of Diflucan for vaginal candidiasis. Urine cultures were sent. Home Lasix and spironolactone were held. Potassium was repleted given setting of exogenous insulin administration. Endocrine was consulted, who recommended low-dose sliding scale before meals, low-dose sliding scale at bedtime, and 8 units Admelog pre-meal. Patient and family met with the diabetes educator about future outpatient management of her diabetes. Patient received 3 day course of IV ceftriaxone for continued genitourinary infection. TTE was performed in setting of PMHx of CHF, which showed normal EF. GYN was consulted and recommended clotrimazole vaginal suppository x 3 days. Patient started and discharged on Nystatin triamcinolone cream BID for 2 weeks.      By time of discharge, patient was feeling improved from time of admission. She endorsed improved weakness and decreased pain with urination. Glucose was in 100s by time of discharge with downtrendeding of beta hydroxy-butyrate. Patient started on lisinopril 10 mg qD and will follow-up with PCP for BMP/BP reading after discharge. Patient was determined to be clinically and medically optimized. The patient was determined to be medically optimized, with plan to follow up with PCP outpatient for diabetes management. Family and patient were on-board with plan to discharge on basal lantus and metformin, and for outpatient appointments for May 9 with endocrine nurse and August 25 with Dr. Araujo @ [address] [place] [address] [address]. Patient to be discharged on low dose statin per endocrinology recommendations.     At this time, patient is medically optimized for discharge to home. 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 Tee, Sean Tinoco), c/b heart block s/p dual chamber PPM 1/12/22, HTN, GERD, endometrial cancer s/p LEVY in 1/2022, s/p radiation a week ago, p/w nausea, decreased appetite, dry mouth, acid reflux and dysuria/vaginal itching for about a week. Patient states she went to gynecologist on 4/18 for UTI and was prescribed cipro and cephalexin. She was also found to have vaginal candidiasis and prescribed 5 day course of clotrimazole/betamethasone cream. On 4/19, she went to her PCP, who found patient to be hyperglycemic to 598 and hypertensive. Metoprolol increased from 25 to 50 mg by PCP. Denies h/o diabetes or insulin use or recent steroid use per son/daughter at bedside. In ED, found to have blood glucose 400-500's, given insulin 6U and 2L NS bolus. Pt started on Lantus 15U at bedtime.    At admission, patient endorsed weakness, pain with urination, dry mouth, and genital itching. Physical exam was unremarkable. Labs were significant for hyperglycemia to 200s and 300s and increased beta-hydroxybutyrate to 0.7. Potassium of 4.9 Anion gap was normal. A1C of 11.1 UA performed which showed large leukocyte esterase concentration, white blood cells, and glycosuria >=1000. Patient received 2 doses of Diflucan for vaginal candidiasis. Urine cultures were sent. Home Lasix and spironolactone were held. Potassium was repleted given setting of exogenous insulin administration. Endocrine was consulted, who recommended low-dose sliding scale before meals, low-dose sliding scale at bedtime, and 8 units Admelog pre-meal. Patient and family met with the diabetes educator about future outpatient management of her diabetes. Patient received 3 day course of IV ceftriaxone for continued genitourinary infection. TTE was performed in setting of PMHx of CHF, which showed normal EF. GYN was consulted and recommended clotrimazole vaginal suppository x 3 days. Patient started and discharged on Nystatin triamcinolone cream BID for 2 weeks.      By time of discharge, patient was feeling improved from time of admission. She endorsed improved weakness and decreased pain with urination. Glucose was in 100s by time of discharge with downtrendeding of beta hydroxy-butyrate. Patient started on lisinopril 10 mg qD and will follow-up with PCP for BMP/BP reading after discharge. Patient was determined to be clinically and medically optimized. The patient was determined to be medically optimized, with plan to follow up with PCP outpatient for diabetes management. Family and patient were on-board with plan to discharge on basal lantus and metformin, and for outpatient appointments for May 9 with endocrine nurse and August 25 with Dr. Araujo @ [address] [place] [address] [address]. Patient to be discharged on high intensity dose statin per endocrinology recommendations. Pt's lasix and spironolactone held on DC and pt to f/u outpt with PCP to restart.    At this time, patient is medically optimized for discharge to home. 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 Tee, Sean Tinoco), c/b heart block s/p dual chamber PPM 1/12/22, HTN, GERD, endometrial cancer s/p LEVY in 1/2022, s/p radiation a week ago, p/w nausea, decreased appetite, dry mouth, acid reflux and dysuria/vaginal itching for about a week. Patient states she went to gynecologist on 4/18 for UTI and was prescribed cipro and cephalexin. She was also found to have vaginal candidiasis and prescribed 5 day course of clotrimazole/betamethasone cream. On 4/19, she went to her PCP, who found patient to be hyperglycemic to 598 and hypertensive. Metoprolol increased from 25 to 50 mg by PCP. Denies h/o diabetes or insulin use or recent steroid use per son/daughter at bedside. In ED, found to have blood glucose 400-500's, given insulin 6U and 2L NS bolus. Pt started on Lantus 15U at bedtime.    At admission, patient endorsed weakness, pain with urination, dry mouth, and genital itching. Physical exam was unremarkable. Labs were significant for hyperglycemia to 200s and 300s and increased beta-hydroxybutyrate to 0.7. Potassium of 4.9 Anion gap was normal. A1C of 11.1 UA performed which showed large leukocyte esterase concentration, white blood cells, and glycosuria >=1000. Patient received 2 doses of Diflucan for vaginal candidiasis. Urine cultures were sent. Home Lasix and spironolactone were held. Potassium was repleted given setting of exogenous insulin administration. Endocrine was consulted, who recommended low-dose sliding scale before meals, low-dose sliding scale at bedtime, and 8 units Admelog pre-meal. Patient and family met with the diabetes educator about future outpatient management of her diabetes. Patient received 3 day course of IV ceftriaxone for continued genitourinary infection. TTE was performed in setting of PMHx of CHF, which showed normal EF. GYN was consulted and recommended clotrimazole vaginal suppository x 3 days. Patient started and discharged on Nystatin triamcinolone cream BID for 2 weeks.      By time of discharge, patient was feeling improved from time of admission. She endorsed improved weakness and decreased pain with urination. Glucose was in 100s by time of discharge with downtrendeding of beta hydroxy-butyrate. Patient started on lisinopril 10 mg qD and will follow-up with PCP for BMP/BP reading after discharge. Patient was determined to be clinically and medically optimized. The patient was determined to be medically optimized, with plan to follow up with PCP outpatient for diabetes management. Family and patient were on-board with plan to discharge on basal lantus and metformin, and for outpatient appointments for May 9 with endocrine nurse and August 25 with Dr. Araujo @ [address] [place] [address] [address]. Patient to be discharged on high intensity dose statin per endocrinology recommendations. Pt's lasix and spironolactone held on DC and pt to f/u outpt with PCP to restart.    At this time, patient is medically optimized for discharge to home. .

## 2022-04-20 NOTE — DISCHARGE NOTE PROVIDER - CARE PROVIDER_API CALL
Delio Allen)  Medicine  [address] [place] [address]  Phone: [phone]  Fax: [phone]  Established Patient  Follow Up Time: 1 week    RACHELLE BARLOW  Endocrinology, Diabetes and Metabolism  [address] [place] [address]  Phone: [phone]  Fax: [phone]  Established Patient  Scheduled Appointment: 08/25/2022

## 2022-04-20 NOTE — PHYSICAL THERAPY INITIAL EVALUATION ADULT - ASSISTIVE DEVICE, REHAB EVAL
Physical Therapy Daily Treatment     Visit Count: 6  Plan of Care Dates: Initial: 8/29/18 Through: 10/11/18  Insurance Information: MEDICARE/PARTA AND B  ID#: [id number]     PHYSICAL THERAPY / SPEECH THERAPY CAP - $ 0            OCCUPATIONAL THERAPY CAP - $ 0  ALL VISITS BASED ON MEDICAL NECESSITY  PER [employer] WEBSITE     PART B DEDUCTIBLE $ 183 / $ 0 REMAINING  Next Referring Provider Visit: 10/1/18    Referred by: Jonathan Martinez MD  Medical Diagnosis (from order):  Acute pain of left shoulder [M25.512]  Treatment Diagnosis: Shoulder Symptoms with Pain, Impaired Joint Mobility, Impaired Range of Motion and Impaired Motor Function/Muscle Performance  Insurance: 1. MEDICARE  2. HUMANA    Date of Onset: one month  Diagnosis Precautions: none  Chart reviewed: Relevant co-morbidities, allergies, tests and medications: left lower extremity cellulitis, degenerative disc disease, GERD, hypercholesterolemia, migraine headache, Rheumatoid Arthritis, back surgery    SUBJECTIVE   Helped the contractors at his house yesterday holding 2x4s and drywall.  States he is stiff today.    Current Pain: 2-3/10 to Left shoulder.     Functional Change: Can't sleep fully on his left side but is getting closer to being able to lay on his side.  Sleeping is still the worst.      OBJECTIVE   See initial evaluation from 8/29/18    Treatment   Manual: x 15 minutes  Supine:   Left 1st rib mobilizations grade 2-3  Left clavicle posterior, inferior and superior grade 2-3 mobilizations  Grade 2-3 inferior and posterior Left glenohumeral joint mobilizations  Passive Left shoulder flexion and External Rotation       Therapeutic Exercise: x 25 minutes  Exercise Repetitions Sets Position/Cues   Arm bike workload 3.0 7 minutes  Forward/retro   Supine AAROM flexion with dowel -     Supine AROM flexion -  Cues for thumb up   Sidelying shoulder abduction -     Sidelying shoulder abduction with eccentric horizontal abduction  -     Scapular retraction -   Cues to avoid upper trap activation   W's with scapular retraction with back on wall 10 1 Unable to rest head or arms on wall   Bilateral pectoralis door stretch  30 seconds 2    W's with scapular retraction vs green tubing 15 1 Cues for form   Seated Bilateral External Rotation vs red theraband  15  Scapular retraction   Sidelying shoulder circles 15 1 Improved extension   Sidelying shoulder abduction into horizontal adduction 15 1 1#   Bilateral chest pulls vs red theraband  15 1 Scapular retraction   Bilateral diagonals vs red theraband  10 Bilateral  1 Scapular retraction   Bilateral shoulder extension vs green tubing 15 1 Scapular retraction   Wall push-ups 10 1 Cues for scapular position   Bilateral scapular wall walks vs red theraband  -     Comments: completed on Left unless otherwise reported       Current Home Program (not performed this date except as noted above):   Exercise: Date issued Date DC Comments   Scapular retraction, isometric external rotation, AAROM shoulder flexion evaluation       Bilateral resisted External Rotation vs theraband, Bilateral W's  9/5/18   Scapular retraction with each exercise.  Issued yellow theraband   Issued red theraband 9/17/18  Add resistance with W's 9/10   Pectoralis door stretch  9/19/18                                  ASSESSMENT   Height of 1st rib is improving.  Begins to self correct posture.  Continues to have thoracic kyphosis.  Added Pectoralis door stretch to HEP.  Continue to increase scapular strengthening.  Reported fatigue with strengthening but no increase in pain.    Pain after treatment: 0/10 to Left shoulder.  Result of above outlined education: Needs reinforcement    Goals:       To be obtained by end of this plan of care:  1. Patient independent with modified and progressed home exercise program.  2. Patient will decrease involved shoulder pain/symptoms to 0/10  to aid in normalization of upper extremity movements to aid activities of independent  daily living.   3. Patient will increase involved shoulder active range of motion to flexion 150°, external rotation 70° without pain to aid in completion of household tasks for independent living.   4. Patient will increase involved shoulder strength to 5/5 to aid in completion of household tasks for independent living.  5. Patient will be able to reach over head without  pain/difficulty to improve function in cooking, reaching into cupboard, grooming.  6. Patient will be able to sleep 6 hours without disruption from pain.   7. DASH: Patient will complete form to reflect an improved score from initial score of 26.6 to less than or equal to 0 (scored 0-100; a higher score indicates greater disability) to indicate pt reported improvement in function/disability/impairment (minimal detectable change: 15 points).    PLAN    Assess use of kinesio taping and reapply as needed. Add prone thoracic mobs and supine thoracic towel stretching  Continue manual interventions to increase ROM and mobility.  Progress AROM shoulder and scapular strength as able.    THERAPY DAILY BILLING   Primary Insurance:  MEDICARE  Secondary Insurance: HUMANA    Evaluation Procedures:  No evaluation codes were used on this date of service    Timed Procedures:  Manual Therapy, 15 minutes  Therapeutic Exercise, 25 minutes    Untimed Procedures:  No untimed codes were used on this date of service    Total Treatment Time: 40 minutes        G-Code:  G-Code Score ABN form  reporting not required this treatment session  Modifier based on outcome measure(s)/functional testing/clinical judgement as listed above    The referring provider's electronic or written signature on the evaluation authorizes the therapy plan of care and certifies the need for these services, furnished under this plan of care while under their care.  Electronically sent for referring provider signature      bed rails

## 2022-04-20 NOTE — PROGRESS NOTE ADULT - PROBLEM SELECTOR PLAN 2
- Presenting with 2 day history of dysurea, frequency, in addition to one week of vaginal pruritis. Per patient, was recently diagnosed with UTI with OP GYN and started on Cipro and Keflex  - Start Ceftriaxone 1g q24h x 3 days  - s/p Diflucan x1 for vanginal candidiasis   - f/u UA  - f/u UCx

## 2022-04-20 NOTE — DISCHARGE NOTE PROVIDER - NSDCMRMEDTOKEN_GEN_ALL_CORE_FT
aspirin 81 mg oral tablet: orally once a day  cephalexin 500 mg oral capsule: 1 cap(s) orally every 12 hours  Cipro 250 mg oral tablet: 1 tab(s) orally every 12 hours  famotidine 40 mg oral tablet: 1 tab(s) orally once a day (at bedtime)  furosemide 40 mg oral tablet: 1 tab(s) orally once a day Am  metoprolol succinate 50 mg oral tablet, extended release: 1 tab(s) orally once a day  omeprazole 40 mg oral delayed release capsule: 1 cap(s) orally once a day  spironolactone 25 mg oral tablet: 1 tab(s) orally once a day noon  Vitamin D3 25 mcg (1000 intl units) oral tablet: 1 tab(s) orally once a day 12 noon   alcohol swabs : Apply topically to affected area 4 times a day   aspirin 81 mg oral tablet: orally once a day  cephalexin 500 mg oral capsule: 1 cap(s) orally every 12 hours  Cipro 250 mg oral tablet: 1 tab(s) orally every 12 hours  famotidine 40 mg oral tablet: 1 tab(s) orally once a day (at bedtime)  furosemide 40 mg oral tablet: 1 tab(s) orally once a day Am  glucometer (per patient&#x27;s insurance): Test blood sugars four times a day. Dispense #1 glucometer.  Insulin Pen Needles, 4mm: 1 application subcutaneously 4 times a day. ** Use with insulin pen **   lancets: 1 application subcutaneously 4 times a day   metoprolol succinate 50 mg oral tablet, extended release: 1 tab(s) orally once a day  omeprazole 40 mg oral delayed release capsule: 1 cap(s) orally once a day  spironolactone 25 mg oral tablet: 1 tab(s) orally once a day noon  test strips (per patient&#x27;s insurance): 1 application subcutaneously 4 times a day. ** Compatible with patient&#x27;s glucometer **  Vitamin D3 25 mcg (1000 intl units) oral tablet: 1 tab(s) orally once a day 12 noon   alcohol swabs : Apply topically to affected area 4 times a day     Dx: Diabetes mellitus   ICD 10: E08.9  alcohol swabs : Apply topically to affected area 3 times a day  Dx: Diabetes mellitus ICD 10: E08.9   aspirin 81 mg oral tablet: orally once a day  cephalexin 500 mg oral capsule: 1 cap(s) orally every 12 hours  Cipro 250 mg oral tablet: 1 tab(s) orally every 12 hours  famotidine 40 mg oral tablet: 1 tab(s) orally once a day (at bedtime)  FreeStyle Dante 1 reader: FreeStyle Dante 1 reader    E11.9 Type 2 diabetes  FreeStyle Dante 2 sensors: FreeStyle Dante 2 sensors     ICD E 11.9 type 2 diabetes  furosemide 40 mg oral tablet: 1 tab(s) orally once a day Am  glucometer (per patient&#x27;s insurance): 1 application subcutaneous 3 times a day   Dx: Diabetes mellitus ICD 10: E08.9   glucometer (per patient&#x27;s insurance): Test blood sugars four times a day. Dispense #1 glucometer.  Dx: Diabetes mellitus   ICD 10: E08.9  Insulin Pen Needles, 4mm: 1 application subcutaneously 3 times a day . ** Use with insulin pen ** Dx: Diabetes mellitus ICD 10: E08.9   lancets: 1 application subcutaneously 3 times a day  Dx: Diabetes mellitus ICD 10: E08.9   Lantus Solostar Pen 100 units/mL subcutaneous solution: 20 unit(s) subcutaneous once a day (at bedtime)    test script   metoprolol succinate 50 mg oral tablet, extended release: 1 tab(s) orally once a day  omeprazole 40 mg oral delayed release capsule: 1 cap(s) orally once a day  spironolactone 25 mg oral tablet: 1 tab(s) orally once a day noon  test strips (per patient&#x27;s insurance): 1 application subcutaneously 3 times a day . ** Compatible with patient&#x27;s glucometer ** Dx: Diabetes mellitus ICD 10: E08.9   Vitamin D3 25 mcg (1000 intl units) oral tablet: 1 tab(s) orally once a day 12 noon   alcohol swabs : Apply topically to affected area 4 times a day     Dx: Diabetes mellitus   ICD 10: E08.9  alcohol swabs : Apply topically to affected area 3 times a day  Dx: Diabetes mellitus ICD 10: E08.9   aspirin 81 mg oral tablet: orally once a day  famotidine 40 mg oral tablet: 1 tab(s) orally once a day (at bedtime)  FreeStyle Dante 1 reader: FreeStyle Dante 1 reader    E11.9 Type 2 diabetes  FreeStyle Dante 2 sensors: FreeStyle Dante 2 sensors     ICD E 11.9 type 2 diabetes  glucometer (per patient&#x27;s insurance): 1 application subcutaneous 3 times a day   Dx: Diabetes mellitus ICD 10: E08.9   glucometer (per patient&#x27;s insurance): Test blood sugars four times a day. Dispense #1 glucometer.  Dx: Diabetes mellitus   ICD 10: E08.9  Insulin Pen Needles, 4mm: 1 application subcutaneously 3 times a day . ** Use with insulin pen ** Dx: Diabetes mellitus ICD 10: E08.9   lancets: 1 application subcutaneously 3 times a day  Dx: Diabetes mellitus ICD 10: E08.9   Lantus Solostar Pen 100 units/mL subcutaneous solution: 20 unit(s) subcutaneous once a day (at bedtime)    test script   metoprolol succinate 50 mg oral tablet, extended release: 1 tab(s) orally once a day  omeprazole 40 mg oral delayed release capsule: 1 cap(s) orally once a day  test strips (per patient&#x27;s insurance): 1 application subcutaneously 3 times a day . ** Compatible with patient&#x27;s glucometer ** Dx: Diabetes mellitus ICD 10: E08.9   Vitamin D3 25 mcg (1000 intl units) oral tablet: 1 tab(s) orally once a day 12 noon   alcohol swabs : Apply topically to affected area 3 times a day  Dx: Diabetes mellitus ICD 10: E08.9   aspirin 81 mg oral tablet: orally once a day  atorvastatin 40 mg oral tablet: 1 tab(s) orally once a day   famotidine 40 mg oral tablet: 1 tab(s) orally once a day (at bedtime)  FreeStyle Dante 1 reader: FreeStyle Dante 1 [place]  FreeStyle Dante 2 sensors: FreeStyle Dante 2 Sensors   glucometer (per patient&#x27;s insurance): 1 application subcutaneous 3 times a day   Dx: Diabetes mellitus ICD 10: E08.9   Insulin Pen Needles, 4mm: 1 application subcutaneously 3 times a day . ** Use with insulin pen ** Dx: Diabetes mellitus ICD 10: E08.9   lancets: 1 application subcutaneously 3 times a day  Dx: Diabetes mellitus ICD 10: E08.9   Lantus Solostar Pen 100 units/mL subcutaneous solution: 25 unit(s) subcutaneous once a day (at bedtime)   lisinopril 10 mg oral tablet: 1 tab(s) orally once a day  metFORMIN 500 mg oral tablet: 1 tab(s) orally 2 times a day   Take 500mg PO twice a day (1 tablet twice a day) for first week. Increase to 1000mg PO twice a day (2 tablet twice a day) after 1 week.   metoprolol succinate 50 mg oral tablet, extended release: 1 tab(s) orally once a day  nystatin-triamcinolone 100,000 units/g-0.1% topical cream: 1 application topically 2 times a day  omeprazole 40 mg oral delayed release capsule: 1 cap(s) orally once a day  petrolatum topical ointment: 1 application topically 2 times a day  test strips (per patient&#x27;s insurance): 1 application subcutaneously 3 times a day . ** Compatible with patient&#x27;s glucometer ** Dx: Diabetes mellitus ICD 10: E08.9   Vitamin D3 25 mcg (1000 intl units) oral tablet: 1 tab(s) orally once a day 12 noon   alcohol swabs : Apply topically to affected area 3 times a day  Dx: Diabetes mellitus ICD 10: E08.9   aspirin 81 mg oral tablet: orally once a day  atorvastatin 40 mg oral tablet: 1 tab(s) orally once a day   famotidine 40 mg oral tablet: 1 tab(s) orally once a day (at bedtime)  glucometer (per patient&#x27;s insurance): 1 application subcutaneous 3 times a day   Dx: Diabetes mellitus ICD 10: E08.9   Insulin Pen Needles, 4mm: 1 application subcutaneously 3 times a day . ** Use with insulin pen ** Dx: Diabetes mellitus ICD 10: E08.9   lancets: 1 application subcutaneously 3 times a day  Dx: Diabetes mellitus ICD 10: E08.9   Lantus Solostar Pen 100 units/mL subcutaneous solution: 25 unit(s) subcutaneous once a day (at bedtime)   lisinopril 10 mg oral tablet: 1 tab(s) orally once a day  metFORMIN 500 mg oral tablet: 1 tab(s) orally 2 times a day   Take 500mg PO twice a day (1 tablet twice a day) for first week. Increase to 1000mg PO twice a day (2 tablet twice a day) after 1 week.   metoprolol succinate 50 mg oral tablet, extended release: 1 tab(s) orally once a day  nystatin 100,000 units/g topical cream: Apply topically to affected area 2 times a day   omeprazole 40 mg oral delayed release capsule: 1 cap(s) orally once a day  petrolatum topical ointment: 1 application topically 2 times a day  test strips (per patient&#x27;s insurance): 1 application subcutaneously 3 times a day . ** Compatible with patient&#x27;s glucometer ** Dx: Diabetes mellitus ICD 10: E08.9   Vitamin D3 25 mcg (1000 intl units) oral tablet: 1 tab(s) orally once a day 12 noon

## 2022-04-20 NOTE — CONSULT NOTE ADULT - SUBJECTIVE AND OBJECTIVE BOX
Reason For Consult: DM    HPI:  81 year old female PMH pacemaker, endometrial adenocarcinoma s/p hysterectomy in Jan and radiation 1 wks ago, HTN, GERD, CHF presents with nausea, loss of appetite, mouth dry asnd bitter, and acid reflux. Went to PMD initially found to be hyperglycemic to 598 and hypertensive. Metoprolol 25mg increased to 50mg bid. Eating poorly.  Seen by GYN yesterday for UTI and found to have a bladder infection. Started on Cipro and cephalexin.     Patient seen at bedside.  Son and daughter on the phone provide collateral history.  Son and daughter both endorse that the mother has no history of diabetes and she consistently follows regularly with her primary care doctor, they were told a few months ago prior to surgery the patient had a high glucose that was treated with insulin and thereafter were not told to follow-up.  Over the past few weeks patient has had limited p.o. intake due to nausea.  Patient has had multiple medical issues over the past year as well as emotional stressors including the loss of her .  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    Status cardiac pacemaker  01/12/2022    Status post placement of implantable loop recorder  removal  01/2022        FAMILY HISTORY:  no hx of DM in the family       Social History:  lives by herself         Outpatient Medications:  Home Medications:   * Patient Currently Takes Medications as of 19-Apr-2022 14:44 documented in Structured Notes  · 	simethicone 80 mg oral tablet, chewable: 1 tab(s) orally every 6 hours, As needed, Gas  · 	senna oral tablet: 1 tab(s) orally once a day (at bedtime), As needed, Constipation  · 	oxyCODONE 5 mg oral tablet: 0.5 tab(s) orally every 6 hours, As Needed MDD:10mg  · 	ibuprofen 600 mg oral tablet: 1 tab(s) orally every 6 hours, As Needed  · 	acetaminophen 500 mg oral tablet: 2 tab(s) orally every 6 hours, As Needed  · 	Vitamin D3 25 mcg (1000 intl units) oral tablet: 1 tab(s) orally once a day 12 noon  · 	aspirin 81 mg oral tablet: orally once a day  · 	furosemide 40 mg oral tablet: 1 tab(s) orally once a day Am  · 	metoprolol succinate 25 mg oral tablet, extended release: 1 tab(s) orally once a day Am  · 	spironolactone 25 mg oral tablet: 1 tab(s) orally once a day noon  · 	omeprazole 40 mg oral delayed release capsule: 1 cap(s) orally once a day, As Needed    .    MEDICATIONS  (STANDING):  aspirin enteric coated 81 milliGRAM(s) Oral daily  cefTRIAXone   IVPB      cefTRIAXone   IVPB 1000 milliGRAM(s) IV Intermittent every 24 hours  dextrose 5%. 1000 milliLiter(s) (50 mL/Hr) IV Continuous <Continuous>  dextrose 5%. 1000 milliLiter(s) (100 mL/Hr) IV Continuous <Continuous>  dextrose 50% Injectable 25 Gram(s) IV Push once  dextrose 50% Injectable 12.5 Gram(s) IV Push once  dextrose 50% Injectable 25 Gram(s) IV Push once  glucagon  Injectable 1 milliGRAM(s) IntraMuscular once  insulin lispro (ADMELOG) corrective regimen sliding scale   SubCutaneous three times a day before meals  insulin lispro (ADMELOG) corrective regimen sliding scale   SubCutaneous at bedtime  insulin lispro Injectable (ADMELOG) 5 Unit(s) SubCutaneous three times a day before meals  metoprolol succinate ER 50 milliGRAM(s) Oral daily  pantoprazole    Tablet 40 milliGRAM(s) Oral before breakfast  sodium chloride 0.9%. 1000 milliLiter(s) (75 mL/Hr) IV Continuous <Continuous>    MEDICATIONS  (PRN):  dextrose Oral Gel 15 Gram(s) Oral once PRN Blood Glucose LESS THAN 70 milliGRAM(s)/deciliter      Allergies    iodinated radiocontrast agents (Unknown)  latex (Other)    Intolerances      Review of Systems:  Constitutional: No fever  Eyes: No blurry vision  Neuro: No tremors  HEENT: No pain  Cardiovascular: No chest pain, palpitations  Respiratory: No SOB, no cough  GI: No nausea, vomiting, abdominal pain  : No dysuria  Skin: no rash  Psych: no depression  Endocrine: no polyuria, polydipsia  Hem/lymph: no swelling  Osteoporosis: no fractures    ALL OTHER SYSTEMS REVIEWED AND NEGATIVE      PHYSICAL EXAM:  VITALS: T(C): 36.4 (04-20-22 @ 12:12)  T(F): 97.5 (04-20-22 @ 12:12), Max: 98.3 (04-20-22 @ 02:25)  HR: 65 (04-20-22 @ 12:12) (63 - 78)  BP: 158/67 (04-20-22 @ 12:12) (139/65 - 158/67)  RR:  (15 - 17)  SpO2:  (96% - 99%)  Wt(kg): --  GENERAL: NAD, well-groomed, well-developed  EYES: No proptosis, no lid lag, anicteric  RESPIRATORY: Clear to auscultation bilaterally; No rales, rhonchi, wheezing, or rubs  CARDIOVASCULAR: Regular rate and rhythm; No murmurs; no peripheral edema  GI: Soft, nontender, non distended, normal bowel sounds  SKIN: Dry, intact, No rashes or lesions  PSYCH: Alert and oriented x 3, normal affect, normal mood      POCT Blood Glucose.: 222 mg/dL (04-20-22 @ 11:53)  POCT Blood Glucose.: 212 mg/dL (04-20-22 @ 06:04)  POCT Blood Glucose.: 196 mg/dL (04-20-22 @ 02:21)  POCT Blood Glucose.: 215 mg/dL (04-20-22 @ 01:32)  POCT Blood Glucose.: 231 mg/dL (04-19-22 @ 23:08)  POCT Blood Glucose.: 262 mg/dL (04-19-22 @ 20:17)  POCT Blood Glucose.: 391 mg/dL (04-19-22 @ 16:41)  POCT Blood Glucose.: 549 mg/dL (04-19-22 @ 13:05)                            13.2   7.69  )-----------( 177      ( 20 Apr 2022 07:47 )             39.3       04-20    134<L>  |  98  |  10  ----------------------------<  226<H>  3.5   |  24  |  0.49<L>    EGFR if : x   EGFR if non : x     Ca    8.7      04-20  Mg     2.00     04-20  Phos  3.1     04-20    TPro  6.2  /  Alb  3.5  /  TBili  0.2  /  DBili  x   /  AST  26  /  ALT  26  /  AlkPhos  114  04-20      Thyroid Function Tests:              Radiology:

## 2022-04-20 NOTE — PHYSICAL THERAPY INITIAL EVALUATION ADULT - ADDITIONAL COMMENTS
Patient lives in a private split ranch house alone with some steps to negotiate in the home. Patient states her daughter lives nearby and frequently visits her to assist her, also had home health aide services 5 days/week for a total of 36 hours per week. Patient uses rolling walker for functional mobility at baseline.     Patient was left seated at edge of bed, call bell with in reach, in NAD. MONI carter.

## 2022-04-20 NOTE — PROGRESS NOTE ADULT - ATTENDING COMMENTS
81F with h/o aortic stenosis, nonobstructive cad, chf, s/p bovine TAVR 4/2021 at [facility] (cardiologist Dr. Tee, Sean Tinoco), c/b heart block s/p dual chamber PPM 1/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 Denies h/o dm or recently on steroids per son/daughter at bedside. In ED, found to have blood glucose 400-500's, given insulin and 2L NS bolus.    A/P:  #newly diagnosed DM2, hyperosmolar nonketotic state (HHS):  beta-hydroxybutyrate 0.7, a1c 11.1  given lantus 15u last night, FS down to 200's  Endocrine consult, dietitian consult  add premeal insulin 5u actid  advance diet  AG 12, no DKA  # dehydration w/ hemoconcentration d/t severe hyperglycemia: IVF as above  # h/o cad/chf/tavr: check TTE  hold diuretic (lasix and aldactone) for now, c/w metoprolol and asa  # UTI/vaginal yeast infection: given diflucan, curbside gyn for further diflucan, Ceftriaxone, f/u UCx  # GERD: c/w PPI  # prophylactic lovenox for dvt ppx

## 2022-04-20 NOTE — DISCHARGE NOTE PROVIDER - NSDCCPCAREPLAN_GEN_ALL_CORE_FT
PRINCIPAL DISCHARGE DIAGNOSIS  Diagnosis: Hyperglycemia  Assessment and Plan of Treatment: Earlier this week, you went to your primary care physician, who measured your blood sugar and found it to be 598, which indicates "hyperglycemia" or high blood sugar. Your hemoglobin A1C, which is essentially a 3-month indicator of how high your blood sugar has been, was found to be elevated at 11.1%. Typically, a healthy hemoglobin A1C is below 7%. Based on your blood sugar and hemoglobin A1C, you were determined to have type II diabetes. Diabetes is a disease in which your blood sugar levels are too high. Sugar comes from the foods we eat, and insulin is the hormone that helps glucose get into the cells to give them energy. In type II diabetes, your cells become insensitive to insulin, which decreases its effect at a cellular level. In the hospital, you received insulin to help lower your blood sugar.   In times of stress, such as your recent radiation last week, or your recent urinary tract infection (which was diagnosed during your gynecology appointment), your body can respond by increasing your blood sugar. This is normally a stress response, so that your body has the energy to respond to a stressor. However, when your blood sugars are too high, you may have symptoms such as increased urination, dehydration, confusion, and fatigue. In the long-term, uncontrolled diabetes can lead to heart disease, kidney disease, damage to your eyes, and nerve problems.   Your vaginal yeast infection, which was treated in the hospital with Diflucan, an antifungal, is also connected to diabetes. People that have type II diabetes are more prone to these types of infections, because the extra sugar in our blood is filtered in the urine, which makes the genitalia more suitable for bacteria and fungus to grow.       PRINCIPAL DISCHARGE DIAGNOSIS  Diagnosis: Hyperglycemia  Assessment and Plan of Treatment: Earlier this week, you went to your primary care physician, who measured your blood sugar and found it to be 598, which indicates "hyperglycemia" or high blood sugar. Your hemoglobin A1C, which is essentially a 3-month indicator of how high your blood sugar has been, was found to be elevated at 11.1%. Typically, a healthy hemoglobin A1C is below 7%. Based on your blood sugar and hemoglobin A1C, you were determined to have type II diabetes. Diabetes is a disease in which your blood sugar levels are too high. Sugar comes from the foods we eat, and insulin is the hormone that helps glucose get into the cells to give them energy. In type II diabetes, your cells become insensitive to insulin, which decreases its effect at a cellular level. In the hospital, you received insulin to help lower your blood sugar.   In times of stress, such as your recent radiation last week, or your recent urinary tract infection (which was diagnosed during your gynecology appointment), your body can respond by increasing your blood sugar. This is normally a stress response, so that your body has the energy to respond to a stressor. However, when your blood sugars are too high, you may have symptoms such as increased urination, dehydration, confusion, and fatigue. In the long-term, uncontrolled diabetes can lead to heart disease, kidney disease, damage to your eyes, and nerve problems.   Your vaginal yeast infection, which was treated in the hospital with Diflucan, an antifungal, is also connected to diabetes. People that have type II diabetes are more prone to these types of infections, because the extra sugar in our blood is filtered in the urine, which makes the genitalia more suitable for bacteria and fungus to grow.  At the hospital, you met with a diabetes educator who explained the recommended treatments and lifestyle modifications necessary to manage type II diabetes. Please follow-up with your primary care physician for further discussion on diabetes management.       PRINCIPAL DISCHARGE DIAGNOSIS  Diagnosis: Hyperglycemia  Assessment and Plan of Treatment: Earlier this week, you went to your primary care physician, who measured your blood sugar and found it to be 598, which indicates "hyperglycemia" or high blood sugar. Your hemoglobin A1C, which is essentially a 3-month indicator of how high your blood sugar has been, was found to be elevated at 11.1%. Typically, a healthy hemoglobin A1C is below 7%. Based on your blood sugar and hemoglobin A1C, you were determined to have type II diabetes. Diabetes is a disease in which your blood sugar levels are too high. Sugar comes from the foods we eat, and insulin is the hormone that helps glucose get into the cells to give them energy. In type II diabetes, your cells become insensitive to insulin, which decreases its effect at a cellular level. In the hospital, you received insulin to help lower your blood sugar.   In times of stress, such as your recent radiation last week, or your recent urinary tract infection (which was diagnosed during your gynecology appointment), your body can respond by increasing your blood sugar. This is normally a stress response, so that your body has the energy to respond to a stressor. However, when your blood sugars are too high, you may have symptoms such as increased urination, dehydration, confusion, and fatigue. In the long-term, uncontrolled diabetes can lead to heart disease, kidney disease, damage to your eyes, and nerve problems.   Your vaginal yeast infection, which was treated in the hospital with Diflucan, an antifungal, is also connected to diabetes. People that have type II diabetes are more prone to these types of infections, because the extra sugar in our blood is filtered in the urine, which makes the genitalia more suitable for bacteria and fungus to grow.  At the hospital, you met with a diabetes educator who explained the recommended treatments and lifestyle modifications necessary to manage type II diabetes. Please follow-up with your primary care physician for further discussion on diabetes management.      SECONDARY DISCHARGE DIAGNOSES  Diagnosis: HTN (hypertension)  Assessment and Plan of Treatment: Your blood pressure was found to be high by your primary care physician. You were started on LISINOPRIL 10 mg ONCE per day. Continue to take this at home. Please follow-up with your primary care physician to re-read your blood pressure and also perform more blood work within the next 1-2 weeks.     PRINCIPAL DISCHARGE DIAGNOSIS  Diagnosis: Hyperglycemia  Assessment and Plan of Treatment: Earlier this week, you went to your primary care physician, who measured your blood sugar and found it to be 598, which indicates "hyperglycemia" or high blood sugar. Your hemoglobin A1C, which is essentially a 3-month indicator of how high your blood sugar has been, was found to be elevated at 11.1%. Typically, a healthy hemoglobin A1C is below 7%. Based on your blood sugar and hemoglobin A1C, you were determined to have type II diabetes. Diabetes is a disease in which your blood sugar levels are too high. Sugar comes from the foods we eat, and insulin is the hormone that helps glucose get into the cells to give them energy. In type II diabetes, your cells become insensitive to insulin, which decreases its effect at a cellular level. In the hospital, you received insulin to help lower your blood sugar.   In times of stress, such as your recent radiation last week, or your recent urinary tract infection (which was diagnosed during your gynecology appointment), your body can respond by increasing your blood sugar. This is normally a stress response, so that your body has the energy to respond to a stressor. However, when your blood sugars are too high, you may have symptoms such as increased urination, dehydration, confusion, and fatigue. In the long-term, uncontrolled diabetes can lead to heart disease, kidney disease, damage to your eyes, and nerve problems.   At the hospital, you met with a diabetes educator who explained the recommended treatments and lifestyle modifications necessary to manage type II diabetes. Please follow-up with your primary care physician for further discussion on diabetes management.  You are recommended for routine follow-up with your PCP, podiatry and ophthalmology.      SECONDARY DISCHARGE DIAGNOSES  Diagnosis: Vaginal candidiasis  Assessment and Plan of Treatment: Your vaginal yeast infection, which was treated in the hospital with Diflucan, an antifungal, is also connected to diabetes. People that have type II diabetes are more prone to these types of infections, because the extra sugar in our blood is filtered in the urine, which makes the genitalia more suitable for bacteria and fungus to grow. You received Diflucan Pill x 2 doses, and clotrimazole vaginal suppository x 3 doses for the infection. You will continue to take Nystatin cream for your itchyness/dryness at the genital/vulva area. You will be discharged for a prescription for nystatin cream and petroleum jelly. Please follow-up with your outpatient OBGYN for ongoing management of vaginal candidiasis and your history of uterine cancer.    Diagnosis: HTN (hypertension)  Assessment and Plan of Treatment: Your blood pressure was found to be high by your primary care physician. You were started on LISINOPRIL 10 mg ONCE per day. Continue to take this at home. Please follow-up with your primary care physician to re-read your blood pressure and also perform more blood work within the next 1-2 weeks. You are to STOP taking Lasix and Spironolactone until you follow-up with your outpatient cardiologist/primary medical doctor.     PRINCIPAL DISCHARGE DIAGNOSIS  Diagnosis: Hyperglycemia  Assessment and Plan of Treatment: Earlier this week, you went to your primary care physician, who measured your blood sugar and found it to be 598, which indicates "hyperglycemia" or high blood sugar. Your hemoglobin A1C, which is essentially a 3-month indicator of how high your blood sugar has been, was found to be elevated at 11.1%. Typically, a healthy hemoglobin A1C is below 7%. Based on your blood sugar and hemoglobin A1C, you were determined to have type II diabetes. Diabetes is a disease in which your blood sugar levels are too high. Sugar comes from the foods we eat, and insulin is the hormone that helps glucose get into the cells to give them energy. In type II diabetes, your cells become insensitive to insulin, which decreases its effect at a cellular level. In the hospital, you received insulin to help lower your blood sugar.   In times of stress, such as your recent radiation last week, or your recent urinary tract infection (which was diagnosed during your gynecology appointment), your body can respond by increasing your blood sugar. This is normally a stress response, so that your body has the energy to respond to a stressor. However, when your blood sugars are too high, you may have symptoms such as increased urination, dehydration, confusion, and fatigue. In the long-term, uncontrolled diabetes can lead to heart disease, kidney disease, damage to your eyes, and nerve problems.   You are recommended for routine follow-up with your PCP, podiatry and ophthalmology.  You are being discharged on Insulin Lantus pen 25U every night + METFORMIN pill 500mg twice a day for first week, then 1000mg twice a day after the first week.  You are also discharged on a low dose statin for high cholesterol (Atorvastatin 40mg once a day). If you experience myalgia/muscle pain or right abdominal pain, please discontinue medication and follow-up with your PMD.      SECONDARY DISCHARGE DIAGNOSES  Diagnosis: Vaginal candidiasis  Assessment and Plan of Treatment: Your vaginal yeast infection, which was treated in the hospital with Diflucan, an antifungal, is also connected to diabetes. People that have type II diabetes are more prone to these types of infections, because the extra sugar in our blood is filtered in the urine, which makes the genitalia more suitable for bacteria and fungus to grow. You received Diflucan Pill x 2 doses, and clotrimazole vaginal suppository x 3 doses for the infection. You will continue to take Nystatin cream for your itchyness/dryness at the genital/vulva area. You will be discharged for a prescription for nystatin cream and petroleum jelly. Please follow-up with your outpatient OBGYN for ongoing management of vaginal candidiasis and your history of uterine cancer.    Diagnosis: HTN (hypertension)  Assessment and Plan of Treatment: Your blood pressure was found to be high by your primary care physician. You were started on LISINOPRIL 10 mg ONCE per day. Continue to take this at home. Please follow-up with your primary care physician to re-read your blood pressure and also perform more blood work within the next 1-2 weeks. You are to STOP taking Lasix and Spironolactone until you follow-up with your outpatient cardiologist/primary medical doctor.     PRINCIPAL DISCHARGE DIAGNOSIS  Diagnosis: Hyperglycemia  Assessment and Plan of Treatment: Earlier this week, you went to your primary care physician, who measured your blood sugar and found it to be 598, which indicates "hyperglycemia" or high blood sugar. Your hemoglobin A1C, which is essentially a 3-month indicator of how high your blood sugar has been, was found to be elevated at 11.1%. Typically, a healthy hemoglobin A1C is below 7%. Based on your blood sugar and hemoglobin A1C, you were determined to have type II diabetes. Diabetes is a disease in which your blood sugar levels are too high. Sugar comes from the foods we eat, and insulin is the hormone that helps glucose get into the cells to give them energy. In type II diabetes, your cells become insensitive to insulin, which decreases its effect at a cellular level. In the hospital, you received insulin to help lower your blood sugar.   In times of stress, such as your recent radiation last week, or your recent urinary tract infection (which was diagnosed during your gynecology appointment), your body can respond by increasing your blood sugar. This is normally a stress response, so that your body has the energy to respond to a stressor. However, when your blood sugars are too high, you may have symptoms such as increased urination, dehydration, confusion, and fatigue. In the long-term, uncontrolled diabetes can lead to heart disease, kidney disease, damage to your eyes, and nerve problems.   You are recommended for routine follow-up with your PCP, podiatry and ophthalmology.  You are being discharged on Insulin Lantus pen 25U every night + METFORMIN pill 500mg twice a day for first week, then 1000mg twice a day after the first week.  You are also discharged on a low dose statin for high cholesterol (Atorvastatin 40mg once a day). If you experience myalgia/muscle pain or right abdominal pain, please discontinue medication and follow-up with your PMD.      SECONDARY DISCHARGE DIAGNOSES  Diagnosis: HTN (hypertension)  Assessment and Plan of Treatment: Your blood pressure was found to be high by your primary care physician. You were started on LISINOPRIL 10 mg ONCE per day. Continue to take this at home. Please follow-up with your primary care physician to re-read your blood pressure and also perform more blood work within the next 1-2 weeks. You are to STOP taking Lasix and Spironolactone until you follow-up with your outpatient cardiologist/primary medical doctor. PLEASE FOLLOW UP WITHIN 1 WEEK WITH YOUR PCP TO DETERMINE WHETHER YOU CAN RESTART LASIX AND SPIRONOLACTONE,    Diagnosis: Vaginal candidiasis  Assessment and Plan of Treatment: Your vaginal yeast infection, which was treated in the hospital with Diflucan, an antifungal, is also connected to diabetes. People that have type II diabetes are more prone to these types of infections, because the extra sugar in our blood is filtered in the urine, which makes the genitalia more suitable for bacteria and fungus to grow. You received Diflucan Pill x 2 doses, and clotrimazole vaginal suppository x 3 doses for the infection. You will continue to take Nystatin cream for your itchyness/dryness at the genital/vulva area. You will be discharged for a prescription for nystatin cream and petroleum jelly. Please follow-up with your outpatient OBGYN for ongoing management of vaginal candidiasis and your history of uterine cancer.

## 2022-04-20 NOTE — DISCHARGE NOTE PROVIDER - NSDCFUADDAPPT_GEN_ALL_CORE_FT
DIABETES MANAGEMENT: You have 2 scheduled appointments   1) May 9th with endocrine team nurse practitioner.   2) August 25th with Dr. Araujo @ [address] [place] [address] [address].   You will receive a phone call about these appointments. If you do not hear back in a few days, please call the office number.

## 2022-04-20 NOTE — DISCHARGE NOTE PROVIDER - PROVIDER TOKENS
PROVIDER:[TOKEN:[[id number]],FOLLOWUP:[1 week],ESTABLISHEDPATIENT:[T]],PROVIDER:[TOKEN:[[id number]],SCHEDULEDAPPT:[08/25/2022],ESTABLISHEDPATIENT:[T]]

## 2022-04-20 NOTE — PHYSICAL THERAPY INITIAL EVALUATION ADULT - PERTINENT HX OF CURRENT PROBLEM, REHAB EVAL
81 year old female PMH pacemaker, endometrial adenocarcinoma s/p hysterectomy in January and radiation 1 weeks ago, HTN, GERD, CHF presents today with nausea, loss of appetite, mouth dry asnd bitter, and acid reflux. admitted for DKA vs HHS, on NS IVF and lasix.

## 2022-04-21 ENCOUNTER — TRANSCRIPTION ENCOUNTER (OUTPATIENT)
Age: 82
End: 2022-04-21

## 2022-04-21 LAB
ANION GAP SERPL CALC-SCNC: 12 MMOL/L — SIGNIFICANT CHANGE UP (ref 7–14)
BUN SERPL-MCNC: 11 MG/DL — SIGNIFICANT CHANGE UP (ref 7–23)
C PEPTIDE SERPL-MCNC: 3.3 NG/ML — SIGNIFICANT CHANGE UP (ref 1.1–4.4)
CALCIUM SERPL-MCNC: 9.1 MG/DL — SIGNIFICANT CHANGE UP (ref 8.4–10.5)
CHLORIDE SERPL-SCNC: 100 MMOL/L — SIGNIFICANT CHANGE UP (ref 98–107)
CHOLEST SERPL-MCNC: 156 MG/DL — SIGNIFICANT CHANGE UP
CO2 SERPL-SCNC: 23 MMOL/L — SIGNIFICANT CHANGE UP (ref 22–31)
CREAT SERPL-MCNC: 0.58 MG/DL — SIGNIFICANT CHANGE UP (ref 0.5–1.3)
EGFR: 90 ML/MIN/1.73M2 — SIGNIFICANT CHANGE UP
GLUCOSE SERPL-MCNC: 297 MG/DL — HIGH (ref 70–99)
HCT VFR BLD CALC: 41.1 % — SIGNIFICANT CHANGE UP (ref 34.5–45)
HDLC SERPL-MCNC: 53 MG/DL — SIGNIFICANT CHANGE UP
HGB BLD-MCNC: 13.5 G/DL — SIGNIFICANT CHANGE UP (ref 11.5–15.5)
LIPID PNL WITH DIRECT LDL SERPL: 78 MG/DL — SIGNIFICANT CHANGE UP
MAGNESIUM SERPL-MCNC: 1.9 MG/DL — SIGNIFICANT CHANGE UP (ref 1.6–2.6)
MCHC RBC-ENTMCNC: 28.2 PG — SIGNIFICANT CHANGE UP (ref 27–34)
MCHC RBC-ENTMCNC: 32.8 GM/DL — SIGNIFICANT CHANGE UP (ref 32–36)
MCV RBC AUTO: 86 FL — SIGNIFICANT CHANGE UP (ref 80–100)
NON HDL CHOLESTEROL: 103 MG/DL — SIGNIFICANT CHANGE UP
NRBC # BLD: 0 /100 WBCS — SIGNIFICANT CHANGE UP
NRBC # FLD: 0 K/UL — SIGNIFICANT CHANGE UP
PHOSPHATE SERPL-MCNC: 3.4 MG/DL — SIGNIFICANT CHANGE UP (ref 2.5–4.5)
PLATELET # BLD AUTO: 184 K/UL — SIGNIFICANT CHANGE UP (ref 150–400)
POTASSIUM SERPL-MCNC: 4.2 MMOL/L — SIGNIFICANT CHANGE UP (ref 3.5–5.3)
POTASSIUM SERPL-SCNC: 4.2 MMOL/L — SIGNIFICANT CHANGE UP (ref 3.5–5.3)
RBC # BLD: 4.78 M/UL — SIGNIFICANT CHANGE UP (ref 3.8–5.2)
RBC # FLD: 13.6 % — SIGNIFICANT CHANGE UP (ref 10.3–14.5)
SODIUM SERPL-SCNC: 135 MMOL/L — SIGNIFICANT CHANGE UP (ref 135–145)
TRIGL SERPL-MCNC: 123 MG/DL — SIGNIFICANT CHANGE UP
WBC # BLD: 7.08 K/UL — SIGNIFICANT CHANGE UP (ref 3.8–10.5)
WBC # FLD AUTO: 7.08 K/UL — SIGNIFICANT CHANGE UP (ref 3.8–10.5)

## 2022-04-21 PROCEDURE — 99233 SBSQ HOSP IP/OBS HIGH 50: CPT | Mod: GC

## 2022-04-21 RX ORDER — ENOXAPARIN SODIUM 100 MG/ML
20 INJECTION SUBCUTANEOUS
Qty: 1 | Refills: 0
Start: 2022-04-21 | End: 2022-05-20

## 2022-04-21 RX ORDER — ISOPROPYL ALCOHOL, BENZOCAINE .7; .06 ML/ML; ML/ML
1 SWAB TOPICAL
Qty: 75 | Refills: 1
Start: 2022-04-21 | End: 2022-06-09

## 2022-04-21 RX ORDER — ISOPROPYL ALCOHOL, BENZOCAINE .7; .06 ML/ML; ML/ML
1 SWAB TOPICAL
Qty: 100 | Refills: 1
Start: 2022-04-21 | End: 2022-06-09

## 2022-04-21 RX ORDER — INSULIN LISPRO 100/ML
8 VIAL (ML) SUBCUTANEOUS
Refills: 0 | Status: DISCONTINUED | OUTPATIENT
Start: 2022-04-21 | End: 2022-04-23

## 2022-04-21 RX ORDER — INSULIN LISPRO 100/ML
VIAL (ML) SUBCUTANEOUS
Refills: 0 | Status: DISCONTINUED | OUTPATIENT
Start: 2022-04-21 | End: 2022-04-23

## 2022-04-21 RX ORDER — INSULIN LISPRO 100/ML
VIAL (ML) SUBCUTANEOUS AT BEDTIME
Refills: 0 | Status: DISCONTINUED | OUTPATIENT
Start: 2022-04-21 | End: 2022-04-23

## 2022-04-21 RX ORDER — LISINOPRIL 2.5 MG/1
10 TABLET ORAL DAILY
Refills: 0 | Status: DISCONTINUED | OUTPATIENT
Start: 2022-04-21 | End: 2022-04-23

## 2022-04-21 RX ORDER — SODIUM CHLORIDE 9 MG/ML
1000 INJECTION INTRAMUSCULAR; INTRAVENOUS; SUBCUTANEOUS
Refills: 0 | Status: COMPLETED | OUTPATIENT
Start: 2022-04-21 | End: 2022-04-21

## 2022-04-21 RX ORDER — INSULIN GLARGINE 100 [IU]/ML
20 INJECTION, SOLUTION SUBCUTANEOUS AT BEDTIME
Refills: 0 | Status: DISCONTINUED | OUTPATIENT
Start: 2022-04-21 | End: 2022-04-22

## 2022-04-21 RX ADMIN — Medication 50 MILLIGRAM(S): at 05:58

## 2022-04-21 RX ADMIN — SODIUM CHLORIDE 75 MILLILITER(S): 9 INJECTION INTRAMUSCULAR; INTRAVENOUS; SUBCUTANEOUS at 13:03

## 2022-04-21 RX ADMIN — Medication 1 APPLICATION(S): at 05:53

## 2022-04-21 RX ADMIN — PANTOPRAZOLE SODIUM 40 MILLIGRAM(S): 20 TABLET, DELAYED RELEASE ORAL at 05:53

## 2022-04-21 RX ADMIN — Medication 81 MILLIGRAM(S): at 11:25

## 2022-04-21 RX ADMIN — Medication 1: at 22:13

## 2022-04-21 RX ADMIN — LISINOPRIL 10 MILLIGRAM(S): 2.5 TABLET ORAL at 12:16

## 2022-04-21 RX ADMIN — Medication 3: at 08:48

## 2022-04-21 RX ADMIN — CEFTRIAXONE 100 MILLIGRAM(S): 500 INJECTION, POWDER, FOR SOLUTION INTRAMUSCULAR; INTRAVENOUS at 18:40

## 2022-04-21 RX ADMIN — Medication 1 APPLICATION(S): at 18:04

## 2022-04-21 RX ADMIN — Medication 15 MILLILITER(S): at 18:03

## 2022-04-21 RX ADMIN — Medication 15 MILLILITER(S): at 00:25

## 2022-04-21 RX ADMIN — Medication 1 APPLICATION(S): at 18:13

## 2022-04-21 RX ADMIN — INSULIN GLARGINE 20 UNIT(S): 100 INJECTION, SOLUTION SUBCUTANEOUS at 22:13

## 2022-04-21 RX ADMIN — Medication 1 APPLICATORFUL: at 11:35

## 2022-04-21 RX ADMIN — Medication 3: at 18:05

## 2022-04-21 RX ADMIN — Medication 8 UNIT(S): at 12:16

## 2022-04-21 RX ADMIN — Medication 15 MILLILITER(S): at 06:01

## 2022-04-21 RX ADMIN — ENOXAPARIN SODIUM 40 MILLIGRAM(S): 100 INJECTION SUBCUTANEOUS at 11:26

## 2022-04-21 RX ADMIN — Medication 8 UNIT(S): at 18:04

## 2022-04-21 RX ADMIN — Medication 1 APPLICATION(S): at 05:59

## 2022-04-21 RX ADMIN — Medication 4: at 12:17

## 2022-04-21 NOTE — PROGRESS NOTE ADULT - PROBLEM SELECTOR PLAN 3
- hx of endometrial adenocarcinoma, s/p radiation x3, most recently 1 wk ago. Concern for vaginal dryness / candidiasis in setting of recent treatment  - will f/u with OP GYN

## 2022-04-21 NOTE — PROGRESS NOTE ADULT - PROBLEM SELECTOR PLAN 2
- Presenting with 2 day history of dysurea, frequency, in addition to one week of vaginal pruritis. Per patient, was recently diagnosed with UTI with OP GYN and started on Cipro and Keflex  - Start Ceftriaxone 1g q24h x 3 days  - s/p Diflucan x1 for vanginal candidiasis   - f/u UA -- c/w UTI  - UCx neg

## 2022-04-21 NOTE — PROGRESS NOTE ADULT - PROBLEM SELECTOR PLAN 5
- Patient with hx of HTN, per EMR, recently noted to have uncotnrolled HTN, and therefore metoprolol increased to 50 BID  - c/w metoprolol  - 4/21 add ACE -I lisinopril 10

## 2022-04-21 NOTE — PROGRESS NOTE ADULT - PROBLEM SELECTOR PLAN 1
- Patient presenting with likely newly diagnosed DM2, hyperosmolar nonketotic state (HHS)  - In ED: AG elevated 17, beta-hydroxybutyrate 0.7, a1c 11.1  - In ED: Glucose 550 -> 330 s/p 6U insulin  - Endo consulted - will see patient 4/20 - will appreciate insulin recs  - Lantus 20 U bedtime, 8U admelog TID

## 2022-04-21 NOTE — DISCHARGE NOTE NURSING/CASE MANAGEMENT/SOCIAL WORK - NSDCFUADDAPPT_GEN_ALL_CORE_FT
DIABETES MANAGEMENT: You have 2 scheduled appointments   1) May 9th with endocrine team nurse practitioner.   2) August 25th with Dr. Araujo @ [address] [place] [address] [address].   You will receive a phone call about these appointments. If you do not hear back in a few days, please call the office number.

## 2022-04-21 NOTE — DIETITIAN INITIAL EVALUATION ADULT - PERTINENT MEDS FT
MEDICATIONS  (STANDING):  aspirin enteric coated 81 milliGRAM(s) Oral daily  Biotene Dry Mouth Oral Rinse 15 milliLiter(s) Swish and Spit two times a day  cefTRIAXone   IVPB      cefTRIAXone   IVPB 1000 milliGRAM(s) IV Intermittent every 24 hours  clotrimazole 2% Vaginal Cream 1 Applicatorful Vaginal daily  dextrose 5%. 1000 milliLiter(s) (100 mL/Hr) IV Continuous <Continuous>  dextrose 5%. 1000 milliLiter(s) (50 mL/Hr) IV Continuous <Continuous>  dextrose 50% Injectable 25 Gram(s) IV Push once  dextrose 50% Injectable 12.5 Gram(s) IV Push once  dextrose 50% Injectable 25 Gram(s) IV Push once  enoxaparin Injectable 40 milliGRAM(s) SubCutaneous every 24 hours  glucagon  Injectable 1 milliGRAM(s) IntraMuscular once  insulin glargine Injectable (LANTUS) 20 Unit(s) SubCutaneous at bedtime  insulin lispro (ADMELOG) corrective regimen sliding scale   SubCutaneous three times a day before meals  insulin lispro (ADMELOG) corrective regimen sliding scale   SubCutaneous at bedtime  insulin lispro Injectable (ADMELOG) 8 Unit(s) SubCutaneous three times a day before meals  lisinopril 10 milliGRAM(s) Oral daily  metoprolol succinate ER 50 milliGRAM(s) Oral daily  nystatin/triamcinolone Cream 1 Application(s) Topical two times a day  pantoprazole    Tablet 40 milliGRAM(s) Oral before breakfast  petrolatum white Ointment 1 Application(s) Topical two times a day  sodium chloride 0.9%. 1000 milliLiter(s) (75 mL/Hr) IV Continuous <Continuous>    MEDICATIONS  (PRN):  dextrose Oral Gel 15 Gram(s) Oral once PRN Blood Glucose LESS THAN 70 milliGRAM(s)/deciliter   Biotene Dry Mouth Oral Rinse, cefTRIAXone IVPB, LANTUS 20U qHS, AMDELOG corrective regimen sliding scale, ADMELOG 8U TIDac, lisinopril, pantoprazole tablet,   sodium chloride 0.9% IV Continuous

## 2022-04-21 NOTE — DISCHARGE NOTE NURSING/CASE MANAGEMENT/SOCIAL WORK - PATIENT PORTAL LINK FT
You can access the FollowMyHealth Patient Portal offered by [facility] by registering at the following website: [email or web address]. By joining [facility]’s FollowMyHealth portal, you will also be able to view your health information using other applications (apps) compatible with our system.

## 2022-04-21 NOTE — DIETITIAN INITIAL EVALUATION ADULT - OTHER CALCULATIONS
Weight history, per chart: (4/20/22 dosing) 226.6 lbs, (3/6/22) 233 lbs, (1/10/22) 231 lbs, (12/7/21) 235 lbs, (8/3/21) 235 lbs.

## 2022-04-21 NOTE — PROGRESS NOTE ADULT - ASSESSMENT
81 year old female PMH pacemaker, endometrial adenocarcinoma s/p hysterectomy in Jan and radiation 1 wks ago, HTN, GERD, CHF presents today with nausea, loss of appetite, mouth dry asnd bitter, and acid reflux. admitted for HHS. Plan dc tmr on insulin.

## 2022-04-21 NOTE — DIETITIAN INITIAL EVALUATION ADULT - LITERATURE/VIDEOS GIVEN
MyPlate Healthy Eating Guidelines, Consistent Carbohydrate Diet for People with Diabetes, Diabetes Label Reading Tips, Portion Guide

## 2022-04-21 NOTE — DIETITIAN INITIAL EVALUATION ADULT - ADD RECOMMEND
2) Provided pt and pt's daughter with in depth written and verbal DM nutrition education. Addressed all concerns as able and made aware RDN remains available.

## 2022-04-21 NOTE — DIETITIAN INITIAL EVALUATION ADULT - PHYSCIAL ASSESSMENT
Appearance consistent with BMI. Pt with no overt signs of muscle wasting/fat loss upon visualization.

## 2022-04-21 NOTE — DIETITIAN INITIAL EVALUATION ADULT - ENERGY INTAKE
Pt with improved appetite/fair PO intake in house, tolerating diet and completing daily menus with personal preferences.

## 2022-04-21 NOTE — PROGRESS NOTE ADULT - SUBJECTIVE AND OBJECTIVE BOX
PROGRESS NOTE:     CONTACT INFO:  Lynda Gonzalez MD | PGY-2  Pager: [phone] [place], [address] [facility]  After 7pm page night float  On weekends after 1pm check resident assignment tab to see who is covering      Patient is a 81y old  Female who presents with a chief complaint of DM2; hyperosmolar nonketotic state (2022 12:54)      SUBJECTIVE / OVERNIGHT EVENTS:    - No acute events overnight.   - No fevers/chills.  - Patient seen and evaluated at bedside.  - Denies SOB at rest, chest pain, palpitations, abdominal pain, nausea/vomiting    ADDITIONAL REVIEW OF SYSTEMS:    MEDICATIONS  (STANDING):  aspirin enteric coated 81 milliGRAM(s) Oral daily  Biotene Dry Mouth Oral Rinse 15 milliLiter(s) Swish and Spit two times a day  cefTRIAXone   IVPB      cefTRIAXone   IVPB 1000 milliGRAM(s) IV Intermittent every 24 hours  clotrimazole 2% Vaginal Cream 1 Applicatorful Vaginal daily  dextrose 5%. 1000 milliLiter(s) (100 mL/Hr) IV Continuous <Continuous>  dextrose 5%. 1000 milliLiter(s) (50 mL/Hr) IV Continuous <Continuous>  dextrose 50% Injectable 25 Gram(s) IV Push once  dextrose 50% Injectable 12.5 Gram(s) IV Push once  dextrose 50% Injectable 25 Gram(s) IV Push once  enoxaparin Injectable 40 milliGRAM(s) SubCutaneous every 24 hours  glucagon  Injectable 1 milliGRAM(s) IntraMuscular once  insulin glargine Injectable (LANTUS) 20 Unit(s) SubCutaneous at bedtime  insulin lispro (ADMELOG) corrective regimen sliding scale   SubCutaneous three times a day before meals  insulin lispro (ADMELOG) corrective regimen sliding scale   SubCutaneous at bedtime  insulin lispro Injectable (ADMELOG) 8 Unit(s) SubCutaneous three times a day before meals  lisinopril 10 milliGRAM(s) Oral daily  metoprolol succinate ER 50 milliGRAM(s) Oral daily  nystatin/triamcinolone Cream 1 Application(s) Topical two times a day  pantoprazole    Tablet 40 milliGRAM(s) Oral before breakfast  petrolatum white Ointment 1 Application(s) Topical two times a day  sodium chloride 0.9%. 1000 milliLiter(s) (75 mL/Hr) IV Continuous <Continuous>    MEDICATIONS  (PRN):  dextrose Oral Gel 15 Gram(s) Oral once PRN Blood Glucose LESS THAN 70 milliGRAM(s)/deciliter      CAPILLARY BLOOD GLUCOSE      POCT Blood Glucose.: 305 mg/dL (2022 12:13)  POCT Blood Glucose.: 270 mg/dL (2022 08:42)  POCT Blood Glucose.: 308 mg/dL (2022 21:41)  POCT Blood Glucose.: 268 mg/dL (2022 17:54)    I&O's Summary    2022 07:01  -  2022 07:00  --------------------------------------------------------  IN: 1075 mL / OUT: 1000 mL / NET: 75 mL    2022 07:01  -  2022 12:46  --------------------------------------------------------  IN: 236 mL / OUT: 250 mL / NET: -14 mL        PHYSICAL EXAM:  Vital Signs Last 24 Hrs  T(C): 36.5 (2022 11:42), Max: 36.7 (2022 22:12)  T(F): 97.7 (2022 11:42), Max: 98 (2022 22:12)  HR: 67 (2022 11:42) (67 - 71)  BP: 159/67 (2022 11:42) (142/58 - 159/67)  BP(mean): --  RR: 17 (2022 11:42) (16 - 17)  SpO2: 95% (2022 11:42) (95% - 98%)    CONSTITUTIONAL: NAD, well-developed  RESPIRATORY: Normal respiratory effort; lungs are clear to auscultation bilaterally  CARDIOVASCULAR: Regular rate and rhythm, normal S1 and S2, no murmur/rub/gallop; No lower extremity edema; Peripheral pulses are 2+ bilaterally  ABDOMEN: Nontender to palpation, normoactive bowel sounds, no rebound/guarding; No hepatosplenomegaly  MUSCLOSKELETAL: no clubbing or cyanosis of digits; no joint swelling or tenderness to palpation  PSYCH: A+O to person, place, and time; affect appropriate    LABS:                        13.5   7.08  )-----------( 184      ( 2022 07:23 )             41.1     04-21    135  |  100  |  11  ----------------------------<  297<H>  4.2   |  23  |  0.58    Ca    9.1      2022 07:23  Phos  3.4     04-  Mg     1.90     -    TPro  6.2  /  Alb  3.5  /  TBili  0.2  /  DBili  x   /  AST  26  /  ALT  26  /  AlkPhos  114  -          Urinalysis Basic - ( 2022 21:03 )    Color: Yellow / Appearance: Clear / S.039 / pH: x  Gluc: x / Ketone: Trace  / Bili: Negative / Urobili: <2 mg/dL   Blood: x / Protein: 30 mg/dL / Nitrite: Negative   Leuk Esterase: Large / RBC: 4 /HPF / WBC 89 /HPF   Sq Epi: x / Non Sq Epi: 3 /HPF / Bacteria: Negative        Culture - Urine (collected 2022 21:52)  Source: Clean Catch Clean Catch (Midstream)  Final Report (2022 21:47):    No growth        RADIOLOGY & ADDITIONAL TESTS:  Results Reviewed:   Imaging Personally Reviewed:  Electrocardiogram Personally Reviewed:    COORDINATION OF CARE:  Care Discussed with Consultants/Other Providers [Y/N]: Y  Prior or Outpatient Records Reviewed [Y/N]: Y

## 2022-04-21 NOTE — DIETITIAN INITIAL EVALUATION ADULT - OTHER INFO
Pt with newly diagnosed diabetes, HbA1c (4/19/22) 11.1%. Endocrinology on board, likely plan for discharge on basal/bolus insulin vs basal plus Ozempic or Trulicity or Metformin.  Pt with newly diagnosed diabetes, HbA1c (4/19/22) 11.1%. Endocrinology on board, likely plan for discharge on basal/bolus insulin vs basal plus Ozempic or Trulicity or Metformin. Pt aware of plan and is learning how to administer insulin.

## 2022-04-21 NOTE — DIETITIAN INITIAL EVALUATION ADULT - PERTINENT LABORATORY DATA
(4/21) Na 135, BUN 11, Cr 0.58, <H>, K+ 4.2, Phos 3.4, Mg 1.90  (4/19) HbA1c --    POCT Blood Glucose.: 305 mg/dL (04-21-22 @ 12:13)  A1C with Estimated Average Glucose Result: 11.1 % (04-19-22 @ 15:28)  A1C with Estimated Average Glucose Result: 8.0 % (01-31-22 @ 13:58)   (4/21) Na 135, BUN 11, Cr 0.58, <H>, K+ 4.2, Phos 3.4, Mg 1.90  (4/19) HbA1c 11.1%<H>  POCT: (4/21) 270-305, (4/20) 196-308

## 2022-04-21 NOTE — DISCHARGE NOTE NURSING/CASE MANAGEMENT/SOCIAL WORK - NSDCPEFALRISK_GEN_ALL_CORE
For information on Fall & Injury Prevention, visit: [email or web address] OR  [email or web address] OR  https://www.cdc.gov/steadi/patient.html

## 2022-04-21 NOTE — DIETITIAN INITIAL EVALUATION ADULT - ORAL INTAKE PTA/DIET HISTORY
Pt Turkish speaking, writer able to communicate in language and daughter at bedside also participated in discussion.  Confirms NKFA, denies chewing/swallowing difficulties. Pt lives at home alone, has family who frequently visit and assist with obtaining groceries however pt is able to cook for herself.   Pt generally with good appetite/PO intake, moderately decreased for a few weeks PTA in the setting of nausea/dizziness/acid reflux/dry mouth. Noted history of endometrial adenocarcinoma, s/p radiation (most recently 1 week ago) and s/p hysterectomy.   Diet recall: oatmeal or Cheerios with yogurt or hard boiled egg, pasta with vegetables, toast with butter or small portion of meat or fruit. Pt does not consume sugar sweetened beverages or add sugar to her coffee.

## 2022-04-21 NOTE — PROGRESS NOTE ADULT - ATTENDING COMMENTS
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 Tee, Sean Tinoco), c/b heart block s/p dual chamber PPM 1/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 Denies h/o dm or recently on steroids per son/daughter at bedside. In ED, found to have blood glucose 400-500's, given insulin and 2L NS bolus.    A/P:  #newly diagnosed DM2, hyperosmolar nonketotic state (HHS):  beta-hydroxybutyrate 0.7, a1c 11.1  's, increase lantus 20u hs and premeal 8u actid, f/u endo recs  insulin teaching, DM education  # dehydration w/ hemoconcentration d/t severe hyperglycemia: IVF   # h/o cad/chf/tavr:  normal LV/RV function with bioprosthetic AVR on TTE  hold diuretic (lasix and aldactone) for now, c/w metoprolol and asa  # UTI/vaginal yeast infection: given diflucan x1 on 4/19, seen by gyn, vaginal candidiasis, added clotrimazole and topical cream per gyn, can consider another dose of diflucan tomorrow  c/w Ceftriaxone for UTI, Ucx no growth  # GERD: c/w PPI  # prophylactic lovenox for dvt ppx  case d/w daughter at bedside  DC plan tomorrow pending final endo recs 81F with h/o aortic stenosis, nonobstructive cad, chf, 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 Denies h/o dm or recently on steroids per son/daughter at bedside. In ED, found to have blood glucose 400-500's, given insulin and 2L NS bolus.    A/P:  #newly diagnosed DM2, hyperosmolar nonketotic state (HHS):  beta-hydroxybutyrate 0.7, a1c 11.1  's, increase lantus 20u hs and premeal 8u actid, f/u endo recs  insulin teaching, DM education  # dehydration w/ hemoconcentration d/t severe hyperglycemia: IVF   # h/o cad/chf/tavr:  normal LV/RV function with bioprosthetic AVR on TTE  hold diuretic (lasix and aldactone) for now, c/w metoprolol and asa  # UTI/vaginal yeast infection: given diflucan x1 on 4/19, seen by gyn, vaginal candidiasis, added clotrimazole and topical cream per gyn, can consider another dose of diflucan tomorrow  c/w Ceftriaxone for UTI, Ucx no growth  # GERD: c/w PPI  # HTN: add lisinopril 10mg qd  # prophylactic lovenox for dvt ppx  case d/w daughter at bedside  DC plan tomorrow pending final endo recs

## 2022-04-22 DIAGNOSIS — B37.3 CANDIDIASIS OF VULVA AND VAGINA: ICD-10-CM

## 2022-04-22 DIAGNOSIS — E11.65 TYPE 2 DIABETES MELLITUS WITH HYPERGLYCEMIA: ICD-10-CM

## 2022-04-22 DIAGNOSIS — E78.5 HYPERLIPIDEMIA, UNSPECIFIED: ICD-10-CM

## 2022-04-22 LAB
CHOLEST SERPL-MCNC: 159 MG/DL — SIGNIFICANT CHANGE UP
HDLC SERPL-MCNC: 49 MG/DL — LOW
LIPID PNL WITH DIRECT LDL SERPL: 83 MG/DL — SIGNIFICANT CHANGE UP
NON HDL CHOLESTEROL: 110 MG/DL — SIGNIFICANT CHANGE UP
TRIGL SERPL-MCNC: 136 MG/DL — SIGNIFICANT CHANGE UP

## 2022-04-22 PROCEDURE — 99232 SBSQ HOSP IP/OBS MODERATE 35: CPT

## 2022-04-22 PROCEDURE — 99233 SBSQ HOSP IP/OBS HIGH 50: CPT | Mod: GC

## 2022-04-22 RX ORDER — FLUCONAZOLE 150 MG/1
150 TABLET ORAL ONCE
Refills: 0 | Status: COMPLETED | OUTPATIENT
Start: 2022-04-22 | End: 2022-04-22

## 2022-04-22 RX ORDER — CEPHALEXIN 500 MG
1 CAPSULE ORAL
Qty: 0 | Refills: 0 | DISCHARGE

## 2022-04-22 RX ORDER — INSULIN GLARGINE 100 [IU]/ML
25 INJECTION, SOLUTION SUBCUTANEOUS AT BEDTIME
Refills: 0 | Status: DISCONTINUED | OUTPATIENT
Start: 2022-04-22 | End: 2022-04-23

## 2022-04-22 RX ORDER — CIPROFLOXACIN LACTATE 400MG/40ML
1 VIAL (ML) INTRAVENOUS
Qty: 0 | Refills: 0 | DISCHARGE

## 2022-04-22 RX ORDER — FUROSEMIDE 40 MG
1 TABLET ORAL
Qty: 0 | Refills: 0 | DISCHARGE

## 2022-04-22 RX ORDER — SPIRONOLACTONE 25 MG/1
1 TABLET, FILM COATED ORAL
Qty: 0 | Refills: 0 | DISCHARGE

## 2022-04-22 RX ADMIN — ENOXAPARIN SODIUM 40 MILLIGRAM(S): 100 INJECTION SUBCUTANEOUS at 11:18

## 2022-04-22 RX ADMIN — Medication 15 MILLILITER(S): at 05:14

## 2022-04-22 RX ADMIN — Medication 8 UNIT(S): at 17:47

## 2022-04-22 RX ADMIN — PANTOPRAZOLE SODIUM 40 MILLIGRAM(S): 20 TABLET, DELAYED RELEASE ORAL at 08:55

## 2022-04-22 RX ADMIN — Medication 2: at 08:54

## 2022-04-22 RX ADMIN — Medication 1 APPLICATORFUL: at 13:39

## 2022-04-22 RX ADMIN — CEFTRIAXONE 100 MILLIGRAM(S): 500 INJECTION, POWDER, FOR SOLUTION INTRAMUSCULAR; INTRAVENOUS at 18:56

## 2022-04-22 RX ADMIN — Medication 81 MILLIGRAM(S): at 13:15

## 2022-04-22 RX ADMIN — SODIUM CHLORIDE 75 MILLILITER(S): 9 INJECTION INTRAMUSCULAR; INTRAVENOUS; SUBCUTANEOUS at 03:15

## 2022-04-22 RX ADMIN — Medication 8 UNIT(S): at 08:54

## 2022-04-22 RX ADMIN — Medication 1 APPLICATION(S): at 06:22

## 2022-04-22 RX ADMIN — Medication 15 MILLILITER(S): at 17:52

## 2022-04-22 RX ADMIN — Medication 2: at 13:16

## 2022-04-22 RX ADMIN — Medication 1 APPLICATION(S): at 18:50

## 2022-04-22 RX ADMIN — INSULIN GLARGINE 25 UNIT(S): 100 INJECTION, SOLUTION SUBCUTANEOUS at 22:09

## 2022-04-22 RX ADMIN — FLUCONAZOLE 150 MILLIGRAM(S): 150 TABLET ORAL at 11:17

## 2022-04-22 RX ADMIN — Medication 8 UNIT(S): at 13:16

## 2022-04-22 RX ADMIN — Medication 1: at 17:48

## 2022-04-22 RX ADMIN — Medication 50 MILLIGRAM(S): at 05:16

## 2022-04-22 RX ADMIN — Medication 1 APPLICATION(S): at 18:28

## 2022-04-22 NOTE — PROGRESS NOTE ADULT - PROBLEM SELECTOR PLAN 3
- hx of endometrial adenocarcinoma, s/p radiation x3, most recently 1 wk ago. Concern for vaginal dryness / candidiasis in setting of recent treatment  - will f/u with OP GYN - Patient with hx of endometrial adenocarcinoma s/p radiation x3, mostly 1 week ago. Longstanding history of vaginal dryness/candidiasis   - Gyn recs:       - clotrimazole x 3 days      - Diflucan x2 (4/19, 4/22)      - f/u with OP GYN for ongoing management

## 2022-04-22 NOTE — PHARMACOTHERAPY INTERVENTION NOTE - COMMENTS
Irish  #[id number] Lilia. Pt educated on basal insulin pen administration, metformin for discharge (how to take, titration, side effections), hypoglycemia and treatment, healthy plate, sources of carbohydrates, goal BG, basics of using a glucometer, and when to check BG; pt with good return demo with insulin pen (she practiced multiple times with me) and verbalized understanding. Showed pt the steps in using a glucometer - informed her I am not sure which brand her insurance will cover but the basic steps are similar. Pt was asking about the Freestyle Dante - team saved RX (informed her medicare requires 4x/insulin but the team can try and send the Rx for her). Counseled pt on where to inject insulin (abdomen, outer thighs), rotating injection site to prevent lipodystrophy, proper insulin storage, and sharps disposal. Pt encouraged for outpt f/u with medicine and endocrine.

## 2022-04-22 NOTE — PROGRESS NOTE ADULT - PROBLEM SELECTOR PLAN 6
Diet: diabetic  DVT: Lovenox  Dispo: home tmr - Patient with hx of HTN, per EMR, recently noted to have uncontrolled HTN, and therefore metoprolol increased to 50 BID  - c/w metoprolol  - 4/21 add ACE -I lisinopril 10

## 2022-04-22 NOTE — PROGRESS NOTE ADULT - ASSESSMENT
81 year old female PMH pacemaker, endometrial adenocarcinoma s/p hysterectomy in Jan and radiation 1 wks ago, HTN, GERD, CHF presents with nausea, loss of appetite, mouth dry and acid reflux. Went to PMD initially found to be hyperglycemic to 598 and hypertensive. Metoprolol 25mg increased to 50mg bid. Eating poorly.  Seen by GYN yesterday for UTI and found to have a bladder infection. Started on Cipro and cephalexin.   Endocrine called for DM (new diagnosis) for the pt and a1c 11.1    1. Newly diagnosed diabetes, likely DM 2  Do not think this pt had true DKA- her BHOB can be due to starvation ketosis and her bicarb was normal (which would be expected to be low in DKA)  A1c 11.1%, goal approximately 7-8%   Cpeptide resulted DETECTABLE at 3.3    While inpatient:  BG target 100-180 mg/dl  Improving but glucose remains above target. Will focus titration on basal insulin, given patient will be likely dc on basal only  Recommend to increase Lantus to 25 units SQ qHS   Continue Admelog 8 units SQ TID before meals (Hold if NPO/not eating meal)  Continue Admelog LOW dose correctional scale before meals, low dose at bedtime   Hypoglycemia protocol in place   Carb Consistent Diet   Nutrition consult   Provider to RN for diabetes/insulin pen teaching   Patient to seen by PAUL pharmacist for education: Patient performed return demonstration of insulin PEN 4/22    Discharge Plan:  Recommend basal insulin PEN (dose TBD) and Metformin 500 mg PO BID with increase to 1000 mg PO BID in 1 week if tolerating  Hold off on initiation of GLP1, as patient has complaints of ongoing nausea. Can re-consider as outpatient  Please check which basal insulin PEN is covered by insurance: Lantus, Basaglar, Tresiba, Toujeo   Patient will also need a glucometer, test strips, lancets, alcohol pads, insulin PEN needles  Can send prescription for Freestyle Dante CGM - team saved RX (informed her medicare requires 4x/insulin but the team can try and send the Rx for her)  Also discussed with daughter the importance of patient learning how to use regular glucometer, as she is newly diagnosed with diabetes   Patient should call PMD for DM related questions or concerns until pt is seen by CDE or endocrine   Recommend routine PCP, podiatry and ophthalmology follow up.   Recommend evaluation by case management and social work for available services at home (Patient has aide. Can consider adjusting timing of basal insulin to the time aide will be present if this would help with adherence, aide can remind her to take it, patient has demonstrated ability to safely self inject independently)   If patient wishes to follow up with [facility] Endocrinology   [facility]  [address], [address], [place] [address]  [phone]   Please call to schedule appointment with CDE/Nutritionist and MD appointment next available     2. HTN  Goal BP in DM <130/80  Management as per the primary team    3. HLD  LDL 83  Consider statin treatment based on LDL and risk factors     Paulette Carrion  Nurse Practitioner  Division of Endocrinology & Diabetes  In house pager #[phone]/long range pager #[phone]    If before 9AM or after 6PM, or on weekends/holidays, please call endocrine answering service for assistance ([phone]).  For nonurgent matters email [email or web address] for assistance.    81 year old female PMH pacemaker, endometrial adenocarcinoma s/p hysterectomy in Jan and radiation 1 wks ago, HTN, GERD, CHF presents with nausea, loss of appetite, mouth dry and acid reflux. Went to PMD initially found to be hyperglycemic to 598 and hypertensive. Metoprolol 25mg increased to 50mg bid. Eating poorly.  Seen by GYN yesterday for UTI and found to have a bladder infection. Started on Cipro and cephalexin.   Endocrine called for DM (new diagnosis) for the pt and a1c 11.1    1. Newly diagnosed diabetes, likely DM 2  Do not think this pt had true DKA- her BHOB can be due to starvation ketosis and her bicarb was normal (which would be expected to be low in DKA)  A1c 11.1%, goal approximately 7-8%   Cpeptide resulted DETECTABLE at 3.3    While inpatient:  BG target 100-180 mg/dl  Improving but glucose remains above target. Will focus titration on basal insulin, given patient will be likely dc on basal only  Recommend to increase Lantus to 25 units SQ qHS   Continue Admelog 8 units SQ TID before meals (Hold if NPO/not eating meal)  Continue Admelog LOW dose correctional scale before meals, low dose at bedtime   Hypoglycemia protocol in place   Carb Consistent Diet   Nutrition consult   Provider to RN for diabetes/insulin pen teaching   Patient to seen by PAUL pharmacist for education: Patient performed return demonstration of insulin PEN 4/22    Discharge Plan:  Recommend basal insulin PEN (dose TBD) and Metformin 500 mg PO BID with increase to 1000 mg PO BID in 1 week if tolerating  Hold off on initiation of GLP1, as patient has complaints of ongoing nausea. Can re-consider as outpatient  Please check which basal insulin PEN is covered by insurance: Lantus, Basaglar, Tresiba, Toujeo   Patient will also need a glucometer, test strips, lancets, alcohol pads, insulin PEN needles  Can send prescription for Freestyle Dante CGM - team saved RX (informed her medicare requires 4x/insulin but the team can try and send the Rx for her)  Also discussed with daughter the importance of patient learning how to use regular glucometer, as she is newly diagnosed with diabetes   Patient should call PMD for DM related questions or concerns until pt is seen by CDE or endocrine   Recommend routine PCP, podiatry and ophthalmology follow up.   Recommend evaluation by case management and social work for available services at home (Patient has aide. Can consider adjusting timing of basal insulin to the time aide will be present if this would help with adherence, aide can remind her to take it, patient has demonstrated ability to safely self inject independently)   If patient wishes to follow up with [facility] Endocrinology   [facility]  [address], [address], [place] [address]  [phone]   Please call to schedule appointment with CDE/Nutritionist and MD appointment next available     2. HTN  Goal BP in DM <130/80  Management as per the primary team    3. HLD  LDL 83  Consider statin treatment based on LDL and risk factors     Discussed plan with primary team (HS 6), case management, pharmacist, patient/family  Paulette Carrion  Nurse Practitioner  Division of Endocrinology & Diabetes  In house pager #[phone]/long range pager #[phone]    If before 9AM or after 6PM, or on weekends/holidays, please call endocrine answering service for assistance ([phone]).  For nonurgent matters email [email or web address] for assistance.

## 2022-04-22 NOTE — PROGRESS NOTE ADULT - SUBJECTIVE AND OBJECTIVE BOX
Chief Complaint: Newly diagnosed diabetes     History: Patient seen at bedside with family present, patient speaks some English, daughter provided interpretation to Kenyan when needed. Patient endorses feeling dry mouth/throat and some intermittent nausea, although she tolerated breakfast this morning  BG trend improving, now 209, 2019 mg/dl  Discussed plan with patient/family, they are agreeable to once daily basal insulin + orals and diet/lifestyle modifications, they would like to try this prior to full basal/bolus plan. Emphasized importance of FSBG monitoring and followup as outpatient. They are also interested in Freestyle Dante CGM as outpatient  Patient performed return demonstration of insulin PEN use with PAUL pharmacist     MEDICATIONS  (STANDING):  aspirin enteric coated 81 milliGRAM(s) Oral daily  Biotene Dry Mouth Oral Rinse 15 milliLiter(s) Swish and Spit two times a day  cefTRIAXone   IVPB      cefTRIAXone   IVPB 1000 milliGRAM(s) IV Intermittent every 24 hours  clotrimazole 2% Vaginal Cream 1 Applicatorful Vaginal daily  dextrose 5%. 1000 milliLiter(s) (100 mL/Hr) IV Continuous <Continuous>  dextrose 5%. 1000 milliLiter(s) (50 mL/Hr) IV Continuous <Continuous>  dextrose 50% Injectable 25 Gram(s) IV Push once  dextrose 50% Injectable 12.5 Gram(s) IV Push once  dextrose 50% Injectable 25 Gram(s) IV Push once  enoxaparin Injectable 40 milliGRAM(s) SubCutaneous every 24 hours  glucagon  Injectable 1 milliGRAM(s) IntraMuscular once  insulin glargine Injectable (LANTUS) 20 Unit(s) SubCutaneous at bedtime  insulin lispro (ADMELOG) corrective regimen sliding scale   SubCutaneous three times a day before meals  insulin lispro (ADMELOG) corrective regimen sliding scale   SubCutaneous at bedtime  insulin lispro Injectable (ADMELOG) 8 Unit(s) SubCutaneous three times a day before meals  lisinopril 10 milliGRAM(s) Oral daily  metoprolol succinate ER 50 milliGRAM(s) Oral daily  nystatin/triamcinolone Cream 1 Application(s) Topical two times a day  pantoprazole    Tablet 40 milliGRAM(s) Oral before breakfast  petrolatum white Ointment 1 Application(s) Topical two times a day    MEDICATIONS  (PRN):  dextrose Oral Gel 15 Gram(s) Oral once PRN Blood Glucose LESS THAN 70 milliGRAM(s)/deciliter    Allergies  iodinated radiocontrast agents (Unknown)  latex (Other)    Review of Systems:  Cardiovascular: No chest pain  Respiratory: No SOB  GI: +nausea  Endocrine: no hypoglycemia     PHYSICAL EXAM:  VITALS: T(C): 36.4 (04-22-22 @ 05:00)  T(F): 97.5 (04-22-22 @ 05:00), Max: 97.6 (04-21-22 @ 22:01)  HR: 63 (04-22-22 @ 05:00) (63 - 66)  BP: 104/84 (04-22-22 @ 05:00) (104/84 - 143/62)  RR:  (16 - 17)  SpO2:  (98% - 98%)  Wt(kg): --  GENERAL: NAD  EYES: No proptosis, no lid lag, anicteric  HEENT:  Atraumatic, Normocephalic, moist mucous membranes  RESPIRATORY: unlabored respirations     CAPILLARY BLOOD GLUCOSE    POCT Blood Glucose.: 219 mg/dL (22 Apr 2022 12:19)  POCT Blood Glucose.: 209 mg/dL (22 Apr 2022 08:37)  POCT Blood Glucose.: 277 mg/dL (21 Apr 2022 21:40)  POCT Blood Glucose.: 272 mg/dL (21 Apr 2022 17:51)      04-21    135  |  100  |  11  ----------------------------<  297<H>  4.2   |  23  |  0.58    EGFR if : x   EGFR if non : x     Ca    9.1      04-21  Mg     1.90     04-21  Phos  3.4     04-21    TPro  6.2  /  Alb  3.5  /  TBili  0.2  /  DBili  x   /  AST  26  /  ALT  26  /  AlkPhos  114  04-20       A1C with Estimated Average Glucose Result: 11.1 % (04-19-22 @ 15:28)  A1C with Estimated Average Glucose Result: 8.0 % (01-31-22 @ 13:58)    Diet, Consistent Carbohydrate/No Snacks (04-20-22 @ 10:24)

## 2022-04-22 NOTE — PROGRESS NOTE ADULT - ASSESSMENT
81 year old female PMH pacemaker, endometrial adenocarcinoma s/p hysterectomy in Jan and radiation 1 wks ago, HTN, GERD, CHF presents today with nausea, loss of appetite, mouth dry asnd bitter, and acid reflux. admitted for HHS. Plan dc tmr on insulin.   81 year old female PMH pacemaker, endometrial adenocarcinoma s/p hysterectomy in Jan and radiation 1 wks ago, HTN, GERD, CHF presents today with nausea, loss of appetite, mouth dry asnd bitter, and acid reflux. admitted for [facility], pending discharge s/p final endo recs.

## 2022-04-22 NOTE — PROGRESS NOTE ADULT - PROBLEM SELECTOR PLAN 5
- Patient with hx of HTN, per EMR, recently noted to have uncotnrolled HTN, and therefore metoprolol increased to 50 BID  - c/w metoprolol  - 4/21 add ACE -I lisinopril 10 - obtain echo  - Gentle IVF in setting of likely DKA  - HOLD lasix and spironolactone while hospitalized with likely DKA

## 2022-04-22 NOTE — PROGRESS NOTE ADULT - PROBLEM SELECTOR PLAN 2
- Presenting with 2 day history of dysurea, frequency, in addition to one week of vaginal pruritis. Per patient, was recently diagnosed with UTI with OP GYN and started on Cipro and Keflex  - Start Ceftriaxone 1g q24h x 3 days  - s/p Diflucan x1 for vanginal candidiasis   - f/u UA -- c/w UTI  - UCx neg - Presenting with 2 day history of dysurea, frequency, in addition to one week of vaginal pruritis. Per patient, was recently diagnosed with UTI with OP GYN and started on Cipro and Keflex  - Start Ceftriaxone 1g q24h x 3 days  - s/p Diflucan x1 for vanginal candidiasis   - Additional dose Diflucan 4/22 for vaginal candiasis  - f/u UA -- c/w UTI  - UCx neg - Presenting with 2 day history of dysurea, frequency, in addition to one week of vaginal pruritis. Per patient, was recently diagnosed with UTI with OP GYN and started on Cipro and Keflex  - c/w Ceftriaxone 1g q24h x 3 days  - s/p Diflucan x1 for vanginal candidiasis   - Additional dose Diflucan 4/22 for vaginal candiasis  - f/u UA -- c/w UTI  - UCx neg

## 2022-04-22 NOTE — PROGRESS NOTE ADULT - PROBLEM SELECTOR PLAN 1
- Patient presenting with likely newly diagnosed DM2, hyperosmolar nonketotic state (HHS)  - In ED: AG elevated 17, beta-hydroxybutyrate 0.7, a1c 11.1  - In ED: Glucose 550 -> 330 s/p 6U insulin  - Endo consulted - will see patient 4/20 - will appreciate insulin recs  - Lantus 20 U bedtime, 8U admelog TID - Patient presenting with likely newly diagnosed DM2, hyperosmolar nonketotic state (HHS)  - In ED: AG elevated 17, beta-hydroxybutyrate 0.7, a1c 11.1  - In ED: Glucose 550 -> 330 s/p 6U insulin  - Endo consulted - will see patient 4/20 - will appreciate insulin recs  - Lantus 20 U bedtime, 8U admelog TID  - Endo consulted 4/22 for final DC recommendations given blood sugars still in 200s  - Outpatient appointment made for follow-up - May 9 - 9AM w/ endocrine nurse and August 25 - 2 PM w/ Dr. Araujo - [address] [facility] [address], [place] - Patient presenting with likely newly diagnosed DM2, hyperosmolar nonketotic state (HHS)  - In ED: AG elevated 17, beta-hydroxybutyrate 0.7, a1c 11.1  - In ED: Glucose 550 -> 330 s/p 6U insulin  - Endo consulted: pending final recs       - Lantus 20 U bedtime, 8U admelog TID  - Outpatient appointment made for follow-up - May 9 - 9AM w/ endocrine nurse and August 25 - 2 PM w/ Dr. Araujo - [address] [facility] [address], [place]

## 2022-04-22 NOTE — PROGRESS NOTE ADULT - PROBLEM SELECTOR PLAN 4
- obtain echo  - Gentle IVF in setting of likely DKA  - HOLD lasix and spironolactone while hospitalized with likely DKA - hx of endometrial adenocarcinoma, s/p radiation x3, most recently 1 wk ago. Concern for vaginal dryness / candidiasis in setting of recent treatment  - will f/u with OP GYN

## 2022-04-22 NOTE — PROGRESS NOTE ADULT - SUBJECTIVE AND OBJECTIVE BOX
Patient:  ROSCOE VIEYRA  [id number]    Progress Note    Interval events: No acute events.  Pertinent ROS (if any):      Administered:  petrolatum white Ointment: 1 Application(s) Topical (04-22 @ 06:22)  nystatin/triamcinolone Cream: 1 Application(s) Topical (04-22 @ 06:22)  metoprolol succinate ER: 50 milliGRAM(s) Oral (04-22 @ 05:16)  Biotene Dry Mouth Oral Rinse: 15 milliLiter(s) Swish and Spit (04-22 @ 05:14)  insulin lispro (ADMELOG) corrective regimen sliding scale: 1 Unit(s) SubCutaneous (04-21 @ 22:13)  insulin glargine Injectable (LANTUS): 20 Unit(s) SubCutaneous (04-21 @ 22:13)        OBJECTIVE:    04-20 @ 07:01  -  04-21 @ 07:00  --------------------------------------------------------  IN: 1075 mL / OUT: 1000 mL / NET: 75 mL    04-21 @ 07:01  -  04-22 @ 06:52  --------------------------------------------------------  IN: 591 mL / OUT: 850 mL / NET: -259 mL      CAPILLARY BLOOD GLUCOSE      POCT Blood Glucose.: 277 mg/dL (21 Apr 2022 21:40)        VITALS:  T(F): 97.5 (04-22-22 @ 05:00), Max: 97.7 (04-21-22 @ 11:42)  HR: 63 (04-22-22 @ 05:00) (63 - 67)  BP: 104/84 (04-22-22 @ 05:00) (104/84 - 159/67)  BP(mean): --  ABP: --  ABP(mean): --  RR: 16 (04-22-22 @ 05:00) (16 - 17)  SpO2: 98% (04-22-22 @ 05:00) (95% - 98%)    PHYSICAL EXAM:  GENERAL: NAD, lying in bed comfortably  HEAD:  Atraumatic, Normocephalic  EYES: EOMI, PERRLA, conjunctiva and sclera clear  ENT: Moist mucous membranes  NECK: Supple, No JVD  CHEST/LUNG: Clear to auscultation bilaterally; No rales, rhonchi, wheezing, or rubs. Unlabored respirations  HEART: Regular rate and rhythm; No murmurs, rubs, or gallops  ABDOMEN: Bowel sounds present; Soft, Nontender, Nondistended. No hepatomegaly  EXTREMITIES:  2+ Peripheral Pulses, brisk capillary refill. No clubbing, cyanosis, or edema  NERVOUS SYSTEM:  Alert & Oriented X3, speech clear. No deficits   MSK: FROM all 4 extremities, full and equal strength  SKIN: No rashes or lesions    HOSPITAL MEDICATIONS:  Standing Meds:  aspirin enteric coated 81 milliGRAM(s) Oral daily  Biotene Dry Mouth Oral Rinse 15 milliLiter(s) Swish and Spit two times a day  cefTRIAXone   IVPB      cefTRIAXone   IVPB 1000 milliGRAM(s) IV Intermittent every 24 hours  clotrimazole 2% Vaginal Cream 1 Applicatorful Vaginal daily  dextrose 5%. 1000 milliLiter(s) IV Continuous <Continuous>  dextrose 5%. 1000 milliLiter(s) IV Continuous <Continuous>  dextrose 50% Injectable 25 Gram(s) IV Push once  dextrose 50% Injectable 12.5 Gram(s) IV Push once  dextrose 50% Injectable 25 Gram(s) IV Push once  enoxaparin Injectable 40 milliGRAM(s) SubCutaneous every 24 hours  glucagon  Injectable 1 milliGRAM(s) IntraMuscular once  insulin glargine Injectable (LANTUS) 20 Unit(s) SubCutaneous at bedtime  insulin lispro (ADMELOG) corrective regimen sliding scale   SubCutaneous three times a day before meals  insulin lispro (ADMELOG) corrective regimen sliding scale   SubCutaneous at bedtime  insulin lispro Injectable (ADMELOG) 8 Unit(s) SubCutaneous three times a day before meals  lisinopril 10 milliGRAM(s) Oral daily  metoprolol succinate ER 50 milliGRAM(s) Oral daily  nystatin/triamcinolone Cream 1 Application(s) Topical two times a day  pantoprazole    Tablet 40 milliGRAM(s) Oral before breakfast  petrolatum white Ointment 1 Application(s) Topical two times a day      PRN Meds:  dextrose Oral Gel 15 Gram(s) Oral once PRN      LABS:  CBC 04-21-22 @ 07:23                        13.5   7.08  )-----------( 184                   41.1       Hgb trend: 13.5 <-- , 13.2 <-- , 16.0 <--   WBC trend: 7.08 <-- , 7.69 <-- , 8.68 <--       CMP 04-21-22 @ 07:23    135  |  100  |  11  ----------------------------<  297<H>  4.2   |  23  |  0.58    Ca    9.1      04-21-22 @ 07:23  Phos  3.4     04-21  Mg     1.90     04-21    TPro  6.2  /  Alb  3.5  /  TBili  0.2  /  DBili  x   /  AST  26  /  ALT  26  /  AlkPhos  114  04-20      Serum Cr trend: 0.58 <-- , 0.49 <-- , 0.65 <--         ABG Trend:     VBG Trend:       MICROBIOLOGY:     Culture - Urine (collected 19 Apr 2022 21:52)  Source: Clean Catch Clean Catch (Midstream)  Final Report (20 Apr 2022 21:47):    No growth        RADIOLOGY:  [ ] Reviewed and interpreted by me    EKG:       Patient:  ROSCOE VIEYRA  [id number]    Progress Note    Interval events: No acute events. AAOx3. No chest pain. No abdominal pain. Eating well. Still endorses vaginal itchiness.   Pertinent ROS (if any):      Administered:  petrolatum white Ointment: 1 Application(s) Topical (04-22 @ 06:22)  nystatin/triamcinolone Cream: 1 Application(s) Topical (04-22 @ 06:22)  metoprolol succinate ER: 50 milliGRAM(s) Oral (04-22 @ 05:16)  Biotene Dry Mouth Oral Rinse: 15 milliLiter(s) Swish and Spit (04-22 @ 05:14)  insulin lispro (ADMELOG) corrective regimen sliding scale: 1 Unit(s) SubCutaneous (04-21 @ 22:13)  insulin glargine Injectable (LANTUS): 20 Unit(s) SubCutaneous (04-21 @ 22:13)        OBJECTIVE:    04-20 @ 07:01  -  04-21 @ 07:00  --------------------------------------------------------  IN: 1075 mL / OUT: 1000 mL / NET: 75 mL    04-21 @ 07:01  -  04-22 @ 06:52  --------------------------------------------------------  IN: 591 mL / OUT: 850 mL / NET: -259 mL      CAPILLARY BLOOD GLUCOSE      POCT Blood Glucose.: 277 mg/dL (21 Apr 2022 21:40)        VITALS:  T(F): 97.5 (04-22-22 @ 05:00), Max: 97.7 (04-21-22 @ 11:42)  HR: 63 (04-22-22 @ 05:00) (63 - 67)  BP: 104/84 (04-22-22 @ 05:00) (104/84 - 159/67)  BP(mean): --  ABP: --  ABP(mean): --  RR: 16 (04-22-22 @ 05:00) (16 - 17)  SpO2: 98% (04-22-22 @ 05:00) (95% - 98%)    PHYSICAL EXAM:  GENERAL: NAD, lying in bed comfortably  HEAD:  Atraumatic, Normocephalic  EYES: EOMI, PERRLA, conjunctiva and sclera clear  ENT: Moist mucous membranes  NECK: Supple, No JVD  CHEST/LUNG: Clear to auscultation bilaterally; No rales, rhonchi, wheezing, or rubs. Unlabored respirations  HEART: Regular rate and rhythm; No murmurs, rubs, or gallops  ABDOMEN: Bowel sounds present; Soft, Nontender, Nondistended. No hepatomegaly  EXTREMITIES:  2+ Peripheral Pulses, brisk capillary refill. No clubbing, cyanosis, or edema  NERVOUS SYSTEM:  Alert & Oriented X3, speech clear. No deficits   MSK: FROM all 4 extremities, full and equal strength  SKIN: No rashes or lesions    HOSPITAL MEDICATIONS:  Standing Meds:  aspirin enteric coated 81 milliGRAM(s) Oral daily  Biotene Dry Mouth Oral Rinse 15 milliLiter(s) Swish and Spit two times a day  cefTRIAXone   IVPB      cefTRIAXone   IVPB 1000 milliGRAM(s) IV Intermittent every 24 hours  clotrimazole 2% Vaginal Cream 1 Applicatorful Vaginal daily  dextrose 5%. 1000 milliLiter(s) IV Continuous <Continuous>  dextrose 5%. 1000 milliLiter(s) IV Continuous <Continuous>  dextrose 50% Injectable 25 Gram(s) IV Push once  dextrose 50% Injectable 12.5 Gram(s) IV Push once  dextrose 50% Injectable 25 Gram(s) IV Push once  enoxaparin Injectable 40 milliGRAM(s) SubCutaneous every 24 hours  glucagon  Injectable 1 milliGRAM(s) IntraMuscular once  insulin glargine Injectable (LANTUS) 20 Unit(s) SubCutaneous at bedtime  insulin lispro (ADMELOG) corrective regimen sliding scale   SubCutaneous three times a day before meals  insulin lispro (ADMELOG) corrective regimen sliding scale   SubCutaneous at bedtime  insulin lispro Injectable (ADMELOG) 8 Unit(s) SubCutaneous three times a day before meals  lisinopril 10 milliGRAM(s) Oral daily  metoprolol succinate ER 50 milliGRAM(s) Oral daily  nystatin/triamcinolone Cream 1 Application(s) Topical two times a day  pantoprazole    Tablet 40 milliGRAM(s) Oral before breakfast  petrolatum white Ointment 1 Application(s) Topical two times a day      PRN Meds:  dextrose Oral Gel 15 Gram(s) Oral once PRN      LABS:  CBC 04-21-22 @ 07:23                        13.5   7.08  )-----------( 184                   41.1       Hgb trend: 13.5 <-- , 13.2 <-- , 16.0 <--   WBC trend: 7.08 <-- , 7.69 <-- , 8.68 <--       CMP 04-21-22 @ 07:23    135  |  100  |  11  ----------------------------<  297<H>  4.2   |  23  |  0.58    Ca    9.1      04-21-22 @ 07:23  Phos  3.4     04-21  Mg     1.90     04-21    TPro  6.2  /  Alb  3.5  /  TBili  0.2  /  DBili  x   /  AST  26  /  ALT  26  /  AlkPhos  114  04-20      Serum Cr trend: 0.58 <-- , 0.49 <-- , 0.65 <--         ABG Trend:     VBG Trend:       MICROBIOLOGY:     Culture - Urine (collected 19 Apr 2022 21:52)  Source: Clean Catch Clean Catch (Midstream)  Final Report (20 Apr 2022 21:47):    No growth        RADIOLOGY:  [ ] Reviewed and interpreted by me    EKG:   Patient:  ROSCOE VIEYRA  [id number]    Progress Note    Interval events: No acute events. AAOx3. No chest pain. No abdominal pain. Eating well. Still endorses vaginal itchiness.   Pertinent ROS (if any):  Denies f/c/n/v/cp/ap/bowel or bladder changes.    Administered:  petrolatum white Ointment: 1 Application(s) Topical (04-22 @ 06:22)  nystatin/triamcinolone Cream: 1 Application(s) Topical (04-22 @ 06:22)  metoprolol succinate ER: 50 milliGRAM(s) Oral (04-22 @ 05:16)  Biotene Dry Mouth Oral Rinse: 15 milliLiter(s) Swish and Spit (04-22 @ 05:14)  insulin lispro (ADMELOG) corrective regimen sliding scale: 1 Unit(s) SubCutaneous (04-21 @ 22:13)  insulin glargine Injectable (LANTUS): 20 Unit(s) SubCutaneous (04-21 @ 22:13)        OBJECTIVE:    04-20 @ 07:01  -  04-21 @ 07:00  --------------------------------------------------------  IN: 1075 mL / OUT: 1000 mL / NET: 75 mL    04-21 @ 07:01  -  04-22 @ 06:52  --------------------------------------------------------  IN: 591 mL / OUT: 850 mL / NET: -259 mL      CAPILLARY BLOOD GLUCOSE      POCT Blood Glucose.: 277 mg/dL (21 Apr 2022 21:40)        VITALS:  T(F): 97.5 (04-22-22 @ 05:00), Max: 97.7 (04-21-22 @ 11:42)  HR: 63 (04-22-22 @ 05:00) (63 - 67)  BP: 104/84 (04-22-22 @ 05:00) (104/84 - 159/67)  BP(mean): --  ABP: --  ABP(mean): --  RR: 16 (04-22-22 @ 05:00) (16 - 17)  SpO2: 98% (04-22-22 @ 05:00) (95% - 98%)    PHYSICAL EXAM:  GENERAL: NAD, lying in bed comfortably  HEAD:  Atraumatic, Normocephalic  EYES: EOMI, PERRLA, conjunctiva and sclera clear  ENT: Moist mucous membranes  NECK: Supple, No JVD  CHEST/LUNG: Clear to auscultation bilaterally; No rales, rhonchi, wheezing, or rubs. Unlabored respirations  HEART: Regular rate and rhythm; No murmurs, rubs, or gallops  ABDOMEN: Bowel sounds present; Soft, Nontender, Nondistended. No hepatomegaly  EXTREMITIES:  2+ Peripheral Pulses, brisk capillary refill. No clubbing, cyanosis, or edema  NERVOUS SYSTEM:  Alert & Oriented X3, speech clear. No deficits   MSK: FROM all 4 extremities, full and equal strength  SKIN: No rashes or lesions    HOSPITAL MEDICATIONS:  Standing Meds:  aspirin enteric coated 81 milliGRAM(s) Oral daily  Biotene Dry Mouth Oral Rinse 15 milliLiter(s) Swish and Spit two times a day  cefTRIAXone   IVPB      cefTRIAXone   IVPB 1000 milliGRAM(s) IV Intermittent every 24 hours  clotrimazole 2% Vaginal Cream 1 Applicatorful Vaginal daily  dextrose 5%. 1000 milliLiter(s) IV Continuous <Continuous>  dextrose 5%. 1000 milliLiter(s) IV Continuous <Continuous>  dextrose 50% Injectable 25 Gram(s) IV Push once  dextrose 50% Injectable 12.5 Gram(s) IV Push once  dextrose 50% Injectable 25 Gram(s) IV Push once  enoxaparin Injectable 40 milliGRAM(s) SubCutaneous every 24 hours  glucagon  Injectable 1 milliGRAM(s) IntraMuscular once  insulin glargine Injectable (LANTUS) 20 Unit(s) SubCutaneous at bedtime  insulin lispro (ADMELOG) corrective regimen sliding scale   SubCutaneous three times a day before meals  insulin lispro (ADMELOG) corrective regimen sliding scale   SubCutaneous at bedtime  insulin lispro Injectable (ADMELOG) 8 Unit(s) SubCutaneous three times a day before meals  lisinopril 10 milliGRAM(s) Oral daily  metoprolol succinate ER 50 milliGRAM(s) Oral daily  nystatin/triamcinolone Cream 1 Application(s) Topical two times a day  pantoprazole    Tablet 40 milliGRAM(s) Oral before breakfast  petrolatum white Ointment 1 Application(s) Topical two times a day      PRN Meds:  dextrose Oral Gel 15 Gram(s) Oral once PRN      LABS:  CBC 04-21-22 @ 07:23                        13.5   7.08  )-----------( 184                   41.1       Hgb trend: 13.5 <-- , 13.2 <-- , 16.0 <--   WBC trend: 7.08 <-- , 7.69 <-- , 8.68 <--       CMP 04-21-22 @ 07:23    135  |  100  |  11  ----------------------------<  297<H>  4.2   |  23  |  0.58    Ca    9.1      04-21-22 @ 07:23  Phos  3.4     04-21  Mg     1.90     04-21    TPro  6.2  /  Alb  3.5  /  TBili  0.2  /  DBili  x   /  AST  26  /  ALT  26  /  AlkPhos  114  04-20      Serum Cr trend: 0.58 <-- , 0.49 <-- , 0.65 <--         ABG Trend:     VBG Trend:       MICROBIOLOGY:     Culture - Urine (collected 19 Apr 2022 21:52)  Source: Clean Catch Clean Catch (Midstream)  Final Report (20 Apr 2022 21:47):    No growth        RADIOLOGY:  [ ] Reviewed and interpreted by me    EKG:

## 2022-04-22 NOTE — PROGRESS NOTE ADULT - ATTENDING COMMENTS
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 Denies h/o dm or recently on steroids per son/daughter at bedside. In ED, found to have blood glucose 400-500's, given insulin and 2L NS bolus.  still c/o vaginal itching    A/P:  #newly diagnosed DM2, hyperosmolar nonketotic state (HHS):  beta-hydroxybutyrate 0.7, a1c 11.1  's, on lantus 20u hs and premeal 8u actid, uptitrate per endo  insulin teaching, DM education  # h/o cad/chf/tavr:  normal LV/RV function with bioprosthetic AVR on TTE  hold diuretic (lasix and aldactone) for now, c/w metoprolol and asa  # UTI/vaginal candidiasis: given diflucan 150mg on 4/19, will give an additional diflucan 150mg 4/22, seen by gyn, added clotrimazole and topical cream per gyn  c/w Ceftriaxone x3 days for UTI, Ucx no growth  # GERD: c/w PPI  # HTN: add lisinopril 10mg qd  # prophylactic lovenox for dvt ppx  case d/w daughter at bedside  DC plan home when cleared by endo, likely tomorrow 81F with h/o aortic stenosis, nonobstructive cad, chf, 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 Denies h/o dm or recently on steroids per son/daughter at bedside. In ED, found to have blood glucose 400-500's, given insulin and 2L NS bolus.  still c/o vaginal itching    A/P:  #newly diagnosed DM2, hyperosmolar nonketotic state (HHS):  beta-hydroxybutyrate 0.7, a1c 11.1  's, on lantus 20u hs and premeal 8u actid, uptitrate per endo  case d/w endo attending, thinks FS is much improved and she can be discharged, but she's not seeing pt today so will wait for final rec  insulin teaching, DM education  # h/o cad/chf/tavr:  normal LV/RV function with bioprosthetic AVR on TTE  hold diuretic (lasix and aldactone) for now, c/w metoprolol and asa  # UTI/vaginal candidiasis: given diflucan 150mg on 4/19, will give an additional diflucan 150mg 4/22, seen by gyn, added clotrimazole and topical cream per gyn  c/w Ceftriaxone x3 days for UTI, Ucx no growth  # GERD: c/w PPI  # HTN: add lisinopril 10mg qd  # prophylactic lovenox for dvt ppx  case d/w daughter at bedside  DC plan home when cleared by endo, likely tomorrow

## 2022-04-23 VITALS
TEMPERATURE: 97 F | DIASTOLIC BLOOD PRESSURE: 60 MMHG | SYSTOLIC BLOOD PRESSURE: 135 MMHG | OXYGEN SATURATION: 97 % | RESPIRATION RATE: 17 BRPM | HEART RATE: 75 BPM

## 2022-04-23 PROCEDURE — 99239 HOSP IP/OBS DSCHRG MGMT >30: CPT

## 2022-04-23 RX ORDER — LISINOPRIL 2.5 MG/1
1 TABLET ORAL
Qty: 30 | Refills: 0
Start: 2022-04-23 | End: 2022-05-22

## 2022-04-23 RX ORDER — PETROLATUM,WHITE
1 JELLY (GRAM) TOPICAL
Qty: 1 | Refills: 0
Start: 2022-04-23 | End: 2022-05-06

## 2022-04-23 RX ORDER — NYSTATIN CREAM 100000 [USP'U]/G
1 CREAM TOPICAL
Qty: 28 | Refills: 0
Start: 2022-04-23 | End: 2022-05-06

## 2022-04-23 RX ORDER — METFORMIN HYDROCHLORIDE 850 MG/1
1 TABLET ORAL
Qty: 100 | Refills: 0
Start: 2022-04-23 | End: 2022-06-11

## 2022-04-23 RX ORDER — NYSTATIN/TRIAMCINOLONE ACET
1 OINTMENT (GRAM) TOPICAL
Qty: 1 | Refills: 0
Start: 2022-04-23 | End: 2022-05-06

## 2022-04-23 RX ORDER — ENOXAPARIN SODIUM 100 MG/ML
25 INJECTION SUBCUTANEOUS
Qty: 1 | Refills: 0
Start: 2022-04-23 | End: 2022-05-22

## 2022-04-23 RX ORDER — ATORVASTATIN CALCIUM 80 MG/1
1 TABLET, FILM COATED ORAL
Qty: 30 | Refills: 0
Start: 2022-04-23 | End: 2022-05-22

## 2022-04-23 RX ADMIN — Medication 1 APPLICATION(S): at 05:27

## 2022-04-23 RX ADMIN — Medication 81 MILLIGRAM(S): at 12:49

## 2022-04-23 RX ADMIN — Medication 8 UNIT(S): at 09:07

## 2022-04-23 RX ADMIN — ENOXAPARIN SODIUM 40 MILLIGRAM(S): 100 INJECTION SUBCUTANEOUS at 12:49

## 2022-04-23 RX ADMIN — Medication 15 MILLILITER(S): at 13:07

## 2022-04-23 RX ADMIN — Medication 1 APPLICATION(S): at 05:39

## 2022-04-23 RX ADMIN — LISINOPRIL 10 MILLIGRAM(S): 2.5 TABLET ORAL at 05:25

## 2022-04-23 RX ADMIN — Medication 8 UNIT(S): at 13:03

## 2022-04-23 RX ADMIN — PANTOPRAZOLE SODIUM 40 MILLIGRAM(S): 20 TABLET, DELAYED RELEASE ORAL at 05:25

## 2022-04-23 RX ADMIN — Medication 1: at 13:03

## 2022-04-23 RX ADMIN — Medication 1: at 09:08

## 2022-04-23 RX ADMIN — Medication 50 MILLIGRAM(S): at 05:40

## 2022-04-23 NOTE — PROGRESS NOTE ADULT - PROBLEM SELECTOR PLAN 3
- Patient with hx of endometrial adenocarcinoma s/p radiation x3, mostly 1 week ago. Longstanding history of vaginal dryness/candidiasis   - Gyn recs:       - clotrimazole x 3 days      - Diflucan x2 (4/19, 4/22)      - f/u with OP GYN for ongoing management - Patient with hx of endometrial adenocarcinoma s/p radiation x3, mostly 1 week ago. Longstanding history of vaginal dryness/candidiasis   - Gyn recs:       - clotrimazole x 3 days      - Diflucan x2 (4/19, 4/22). Plan to d/c on topical       - f/u with OP GYN for ongoing management

## 2022-04-23 NOTE — PROGRESS NOTE ADULT - PROBLEM SELECTOR PLAN 2
- Presenting with 2 day history of dysurea, frequency, in addition to one week of vaginal pruritis. Per patient, was recently diagnosed with UTI with OP GYN and started on Cipro and Keflex  - c/w Ceftriaxone 1g q24h x 3 days  - s/p Diflucan x1 for vanginal candidiasis   - Additional dose Diflucan 4/22 for vaginal candiasis  - f/u UA -- c/w UTI  - UCx neg

## 2022-04-23 NOTE — PROGRESS NOTE ADULT - ASSESSMENT
81 year old female PMH pacemaker, endometrial adenocarcinoma s/p hysterectomy in Jan and radiation 1 wks ago, HTN, GERD, CHF presents today with nausea, loss of appetite, mouth dry asnd bitter, and acid reflux. admitted for [facility], pending discharge s/p final endo recs.   81 year old female PMH pacemaker, endometrial adenocarcinoma s/p hysterectomy in Jan and radiation 1 wks ago, HTN, GERD, CHF presents today with nausea, loss of appetite, mouth dry and bitter, and acid reflux. admitted for [facility], pending discharge s/p final endo recs.

## 2022-04-23 NOTE — PROGRESS NOTE ADULT - PROBLEM SELECTOR PLAN 1
- Patient presenting with likely newly diagnosed DM2, hyperosmolar nonketotic state (HHS)  - In ED: AG elevated 17, beta-hydroxybutyrate 0.7, a1c 11.1  - In ED: Glucose 550 -> 330 s/p 6U insulin  - Endo consulted: pending final recs       - Lantus 20 U bedtime, 8U admelog TID  - Outpatient appointment made for follow-up - May 9 - 9AM w/ endocrine nurse and August 25 - 2 PM w/ Dr. Araujo - [address] [facility] [address], [place] - Patient presenting with likely newly diagnosed DM2, hyperosmolar nonketotic state (HHS)  - In ED: AG elevated 17, beta-hydroxybutyrate 0.7, a1c 11.1  - In ED: Glucose 550 -> 330 s/p 6U insulin  - Endo consulted: pending final recs       - Plan to d/c on Lantus 25U basal + metformin        - c/w Lantus 25/admelog 8 while hospitalized   - Outpatient appointment made for follow-up - May 9 - 9AM w/ endocrine nurse and August 25 - 2 PM w/ Dr. Araujo - [address] [facility] [address], [place]  - Patient will f/u OP PMD/Endo regarding starting statin given age>80 versus LDL>80

## 2022-04-23 NOTE — CHART NOTE - NSCHARTNOTEFT_GEN_A_CORE
pt with hyperglycemia     primary team adjusting insulin, agree with adjustments, will not make further changes for now and see how pt will respond to adjustments made today.    please see full consult note from 4/20 for plan of care      Teetee Dela Cruz MD  Attending Physician   Department of Endocrinology, Diabetes and Metabolism     Pager  [phone] [please provide 10 digit call back number]  [facility] 9-5  [email or web address]  Nights and weekends: [phone]  Please note that this patient may be followed by a different provider tomorrow.   If no answer or after hours, please contact [phone].  For final dc reccomendations, please call [phone] or page the endocrine fellow on call.
81 year old female PMH pacemaker, endometrial adenocarcinoma s/p hysterectomy in Jan and radiation 1 wks ago, HTN, GERD, CHF presents with nausea, loss of appetite, mouth dry and acid reflux. Went to PMD initially found to be hyperglycemic to 598 and hypertensive. Metoprolol 25mg increased to 50mg bid. Eating poorly.  Seen by GYN yesterday for UTI and found to have a bladder infection. Started on Cipro and cephalexin.   Endocrine called for DM (new diagnosis) for the pt and a1c 11.1    Per primary team, plan for discharge today  Reviewed plan with MD Villagomez  BG trend improved, most recent 161 mg/dl    See prior endocrine progress note from 4/22 for full plan of care, discussion with patient/family and education provided  Recommend Lantus Solostar PEN 25 units SQ qHS PLUS Metformin 500 mg PO BID with increase to 1000 mg PO BID in 1 week if tolerating  Hold off on initiation of GLP1, as patient has complaints of ongoing nausea. Can re-consider as outpatient  Please check which basal insulin PEN is covered by insurance: Lantus, Basaglar, Tresiba, Toujeo   Patient will also need a glucometer, test strips, lancets, alcohol pads, insulin PEN needles  Can send prescription for Freestyle Dante CGM - team saved RX (informed her medicare requires 4x/insulin but the team can try and send the Rx for her)  Also discussed with daughter the importance of patient learning how to use regular glucometer, as she is newly diagnosed with diabetes   Patient should call PMD for DM related questions or concerns until pt is seen by CDE or endocrine   Recommend routine PCP, podiatry and ophthalmology follow up.   Recommend evaluation by case management and social work for available services at home (Patient has aide. Can consider adjusting timing of basal insulin to the time aide will be present if this would help with adherence, aide can remind her to take it, patient has demonstrated ability to safely self inject independently)     Appointments scheduled with [facility], per patient and family/request  [facility]  [address] [place] [address], [address], [place], [place] [address] [phone]     1. With endocrine NP Maria: 5/9/22 at 9:00 AM  2. With endocrinologist Dr. Araujo 8/25/22 at 3:20 PM      CAPILLARY BLOOD GLUCOSE    POCT Blood Glucose.: 161 mg/dL (23 Apr 2022 08:51)  POCT Blood Glucose.: 174 mg/dL (22 Apr 2022 21:23)  POCT Blood Glucose.: 161 mg/dL (22 Apr 2022 17:32)  POCT Blood Glucose.: 219 mg/dL (22 Apr 2022 12:19)    MEDICATIONS  (STANDING):  aspirin enteric coated 81 milliGRAM(s) Oral daily  Biotene Dry Mouth Oral Rinse 15 milliLiter(s) Swish and Spit two times a day  clotrimazole 2% Vaginal Cream 1 Applicatorful Vaginal daily  dextrose 5%. 1000 milliLiter(s) (100 mL/Hr) IV Continuous <Continuous>  dextrose 5%. 1000 milliLiter(s) (50 mL/Hr) IV Continuous <Continuous>  dextrose 50% Injectable 25 Gram(s) IV Push once  dextrose 50% Injectable 12.5 Gram(s) IV Push once  dextrose 50% Injectable 25 Gram(s) IV Push once  enoxaparin Injectable 40 milliGRAM(s) SubCutaneous every 24 hours  glucagon  Injectable 1 milliGRAM(s) IntraMuscular once  insulin glargine Injectable (LANTUS) 25 Unit(s) SubCutaneous at bedtime  insulin lispro (ADMELOG) corrective regimen sliding scale   SubCutaneous three times a day before meals  insulin lispro (ADMELOG) corrective regimen sliding scale   SubCutaneous at bedtime  insulin lispro Injectable (ADMELOG) 8 Unit(s) SubCutaneous three times a day before meals  lisinopril 10 milliGRAM(s) Oral daily  metoprolol succinate ER 50 milliGRAM(s) Oral daily  nystatin/triamcinolone Cream 1 Application(s) Topical two times a day  pantoprazole    Tablet 40 milliGRAM(s) Oral before breakfast  petrolatum white Ointment 1 Application(s) Topical two times a day      Diet, Consistent Carbohydrate/No Snacks (04-20-22 @ 10:24)      A1C with Estimated Average Glucose Result: 11.1 % (04-19-22 @ 15:28)  A1C with Estimated Average Glucose Result: 8.0 % (01-31-22 @ 13:58)      Paulette Carrion  Nurse Practitioner  Division of Endocrinology & Diabetes  In house pager #[phone]/long range pager #[phone]    If before 9AM or after 6PM, or on weekends/holidays, please call endocrine answering service for assistance ([phone]).  For nonurgent matters email [email or web address] for assistance.
Background:   81 year old female PMH pacemaker, endometrial adenocarcinoma s/p hysterectomy in Jan and radiation 1 wks ago, HTN, GERD, CHF presents today with nausea, loss of appetite, mouth dry asnd bitter, and acid reflux. Went to PMD initially found to be hyperglycemic to 598 and hypertensive. Metoprolol 25mg increased to 50mg bid. Eating poorly.  Seen by GYN yesterday for UTI and found to have a bladder infection. Started on Cipro and cephalexin. (19 Apr 2022 18:06)    GYN consulted today for continued vulvar/ vaginal pruritis with thick white vaginal discharge concerning for vaginal candidiasis. Pt s/p 1x dose of Diflucan without significant change in symptoms.     Recommendations:   - For vaginal irritation and pruritis:  Clotrimazole 2% vaginal suppository, once a day for 3 days.   - For vulvar irritation and pruritis: Nystatin - Triamcinole cream BID for 2 weeks and then PRN as needed  - For skin breakdown and burning on the vulva - regular use of barrier protection (petroleum jelly)    - Pt can follow with primary OB/GYN or can see our Gyn team in 6 weeks for reassessment.     Pt can reach out to schedule an appointment: [phone]     Case discussed with GYN attending Dr. Lulú Goetz, PGY-1

## 2022-04-23 NOTE — PROGRESS NOTE ADULT - ATTENDING COMMENTS
Agree w/ assessment and plan as stated above    Endo recs appreciated in re to discharge DM regimen and follow up    Dc planning for today. Management plan and f/u d/w pt and daughter at bedside. Further details outlined in dc documentation. Spent 36 min in discharge planning and coordination.

## 2022-04-23 NOTE — PROGRESS NOTE ADULT - PROBLEM SELECTOR PLAN 6
- Patient with hx of HTN, per EMR, recently noted to have uncontrolled HTN, and therefore metoprolol increased to 50 BID  - c/w metoprolol  - 4/21 add ACE -I lisinopril 10

## 2022-04-23 NOTE — PROGRESS NOTE ADULT - PROBLEM SELECTOR PLAN 7
Diet: diabetic  DVT: Lovenox  Dispo: home tmr pending final endo recs
Diet: diabetic  DVT: Lovenox  Dispo: home tmr pending final endo recs

## 2022-04-23 NOTE — PROGRESS NOTE ADULT - SUBJECTIVE AND OBJECTIVE BOX
Patient:  ROSCOE VIEYRA  [id number]    Progress Note    Interval events: No acute events.  Pertinent ROS (if any):      Administered:  metoprolol succinate ER: 50 milliGRAM(s) Oral (04-23 @ 05:40)  petrolatum white Ointment: 1 Application(s) Topical (04-23 @ 05:39)  nystatin/triamcinolone Cream: 1 Application(s) Topical (04-23 @ 05:27)  pantoprazole    Tablet: 40 milliGRAM(s) Oral (04-23 @ 05:25)  lisinopril: 10 milliGRAM(s) Oral (04-23 @ 05:25)  insulin glargine Injectable (LANTUS): 25 Unit(s) SubCutaneous (04-22 @ 22:09)        OBJECTIVE:    04-22 @ 07:01  -  04-23 @ 07:00  --------------------------------------------------------  IN: 120 mL / OUT: 1900 mL / NET: -1780 mL      CAPILLARY BLOOD GLUCOSE      POCT Blood Glucose.: 174 mg/dL (22 Apr 2022 21:23)        VITALS:  T(F): 98.4 (04-23-22 @ 04:58), Max: 98.4 (04-23-22 @ 04:58)  HR: 64 (04-23-22 @ 04:58) (64 - 68)  BP: 133/66 (04-23-22 @ 04:58) (133/66 - 143/52)  BP(mean): --  ABP: --  ABP(mean): --  RR: 17 (04-23-22 @ 04:58) (16 - 17)  SpO2: 97% (04-23-22 @ 04:58) (97% - 98%)    PHYSICAL EXAM:  GENERAL: NAD, lying in bed comfortably  HEAD:  Atraumatic, Normocephalic  EYES: EOMI, PERRLA, conjunctiva and sclera clear  ENT: Moist mucous membranes  NECK: Supple, No JVD  CHEST/LUNG: Clear to auscultation bilaterally; No rales, rhonchi, wheezing, or rubs. Unlabored respirations  HEART: Regular rate and rhythm; No murmurs, rubs, or gallops  ABDOMEN: Bowel sounds present; Soft, Nontender, Nondistended. No hepatomegaly  EXTREMITIES:  2+ Peripheral Pulses, brisk capillary refill. No clubbing, cyanosis, or edema  NERVOUS SYSTEM:  Alert & Oriented X3, speech clear. No deficits   MSK: FROM all 4 extremities, full and equal strength  SKIN: No rashes or lesions    HOSPITAL MEDICATIONS:  Standing Meds:  aspirin enteric coated 81 milliGRAM(s) Oral daily  Biotene Dry Mouth Oral Rinse 15 milliLiter(s) Swish and Spit two times a day  clotrimazole 2% Vaginal Cream 1 Applicatorful Vaginal daily  dextrose 5%. 1000 milliLiter(s) IV Continuous <Continuous>  dextrose 5%. 1000 milliLiter(s) IV Continuous <Continuous>  dextrose 50% Injectable 25 Gram(s) IV Push once  dextrose 50% Injectable 12.5 Gram(s) IV Push once  dextrose 50% Injectable 25 Gram(s) IV Push once  enoxaparin Injectable 40 milliGRAM(s) SubCutaneous every 24 hours  glucagon  Injectable 1 milliGRAM(s) IntraMuscular once  insulin glargine Injectable (LANTUS) 25 Unit(s) SubCutaneous at bedtime  insulin lispro (ADMELOG) corrective regimen sliding scale   SubCutaneous three times a day before meals  insulin lispro (ADMELOG) corrective regimen sliding scale   SubCutaneous at bedtime  insulin lispro Injectable (ADMELOG) 8 Unit(s) SubCutaneous three times a day before meals  lisinopril 10 milliGRAM(s) Oral daily  metoprolol succinate ER 50 milliGRAM(s) Oral daily  nystatin/triamcinolone Cream 1 Application(s) Topical two times a day  pantoprazole    Tablet 40 milliGRAM(s) Oral before breakfast  petrolatum white Ointment 1 Application(s) Topical two times a day      PRN Meds:  dextrose Oral Gel 15 Gram(s) Oral once PRN      LABS:  CBC 04-21-22 @ 07:23                        13.5   7.08  )-----------( 184                   41.1       Hgb trend: 13.5 <-- , 13.2 <--   WBC trend: 7.08 <-- , 7.69 <--       CMP 04-21-22 @ 07:23    135  |  100  |  11  ----------------------------<  297<H>  4.2   |  23  |  0.58    Phos  3.4     04-21  Mg     1.90     04-21        Serum Cr trend: 0.58 <-- , 0.49 <--         ABG Trend:     VBG Trend:       MICROBIOLOGY:       RADIOLOGY:  [ ] Reviewed and interpreted by me    EKG:   Patient:  ROSCOE VIEYRA  [id number]    Progress Note    Interval events: No acute events. Patient resting comfortably. States dysuria and vaginal itching has improved.   Pertinent ROS (if any): Denies f/c/n/v/cp/ap/bowel or bladder changes.       Administered:  metoprolol succinate ER: 50 milliGRAM(s) Oral (04-23 @ 05:40)  petrolatum white Ointment: 1 Application(s) Topical (04-23 @ 05:39)  nystatin/triamcinolone Cream: 1 Application(s) Topical (04-23 @ 05:27)  pantoprazole    Tablet: 40 milliGRAM(s) Oral (04-23 @ 05:25)  lisinopril: 10 milliGRAM(s) Oral (04-23 @ 05:25)  insulin glargine Injectable (LANTUS): 25 Unit(s) SubCutaneous (04-22 @ 22:09)    OBJECTIVE:    04-22 @ 07:01  -  04-23 @ 07:00  --------------------------------------------------------  IN: 120 mL / OUT: 1900 mL / NET: -1780 mL      CAPILLARY BLOOD GLUCOSE    POCT Blood Glucose.: 174 mg/dL (22 Apr 2022 21:23)    VITALS:  T(F): 98.4 (04-23-22 @ 04:58), Max: 98.4 (04-23-22 @ 04:58)  HR: 64 (04-23-22 @ 04:58) (64 - 68)  BP: 133/66 (04-23-22 @ 04:58) (133/66 - 143/52)  BP(mean): --  ABP: --  ABP(mean): --  RR: 17 (04-23-22 @ 04:58) (16 - 17)  SpO2: 97% (04-23-22 @ 04:58) (97% - 98%)    PHYSICAL EXAM:  GENERAL: NAD, lying in bed comfortably  HEAD:  Atraumatic, Normocephalic  EYES: EOMI, PERRLA, conjunctiva and sclera clear  ENT: Moist mucous membranes  NECK: Supple, No JVD  CHEST/LUNG: Clear to auscultation bilaterally; No rales, rhonchi, wheezing, or rubs. Unlabored respirations  HEART: Regular rate and rhythm; No murmurs, rubs, or gallops  ABDOMEN: Bowel sounds present; Soft, Nontender, Nondistended. No hepatomegaly  EXTREMITIES:  2+ Peripheral Pulses, brisk capillary refill. No clubbing, cyanosis, or edema  NERVOUS SYSTEM:  Alert & Oriented X3, speech clear. No deficits   MSK: FROM all 4 extremities, full and equal strength  SKIN: No rashes or lesions    HOSPITAL MEDICATIONS:  Standing Meds:  aspirin enteric coated 81 milliGRAM(s) Oral daily  Biotene Dry Mouth Oral Rinse 15 milliLiter(s) Swish and Spit two times a day  clotrimazole 2% Vaginal Cream 1 Applicatorful Vaginal daily  dextrose 5%. 1000 milliLiter(s) IV Continuous <Continuous>  dextrose 5%. 1000 milliLiter(s) IV Continuous <Continuous>  dextrose 50% Injectable 25 Gram(s) IV Push once  dextrose 50% Injectable 12.5 Gram(s) IV Push once  dextrose 50% Injectable 25 Gram(s) IV Push once  enoxaparin Injectable 40 milliGRAM(s) SubCutaneous every 24 hours  glucagon  Injectable 1 milliGRAM(s) IntraMuscular once  insulin glargine Injectable (LANTUS) 25 Unit(s) SubCutaneous at bedtime  insulin lispro (ADMELOG) corrective regimen sliding scale   SubCutaneous three times a day before meals  insulin lispro (ADMELOG) corrective regimen sliding scale   SubCutaneous at bedtime  insulin lispro Injectable (ADMELOG) 8 Unit(s) SubCutaneous three times a day before meals  lisinopril 10 milliGRAM(s) Oral daily  metoprolol succinate ER 50 milliGRAM(s) Oral daily  nystatin/triamcinolone Cream 1 Application(s) Topical two times a day  pantoprazole    Tablet 40 milliGRAM(s) Oral before breakfast  petrolatum white Ointment 1 Application(s) Topical two times a day      PRN Meds:  dextrose Oral Gel 15 Gram(s) Oral once PRN      LABS:  CBC 04-21-22 @ 07:23                        13.5   7.08  )-----------( 184                   41.1       Hgb trend: 13.5 <-- , 13.2 <--   WBC trend: 7.08 <-- , 7.69 <--       CMP 04-21-22 @ 07:23    135  |  100  |  11  ----------------------------<  297<H>  4.2   |  23  |  0.58    Phos  3.4     04-21  Mg     1.90     04-21      Serum Cr trend: 0.58 <-- , 0.49 <--       ABG Trend:     VBG Trend:       MICROBIOLOGY:       RADIOLOGY:  [ ] Reviewed and interpreted by me    EKG:

## 2022-05-09 ENCOUNTER — APPOINTMENT (OUTPATIENT)
Dept: ENDOCRINOLOGY | Facility: CLINIC | Age: 82
End: 2022-05-09
Payer: MEDICARE

## 2022-05-09 VITALS
SYSTOLIC BLOOD PRESSURE: 150 MMHG | HEART RATE: 66 BPM | BODY MASS INDEX: 42.89 KG/M2 | WEIGHT: 227 LBS | DIASTOLIC BLOOD PRESSURE: 76 MMHG | TEMPERATURE: 97 F | OXYGEN SATURATION: 98 %

## 2022-05-09 PROCEDURE — 99205 OFFICE O/P NEW HI 60 MIN: CPT

## 2022-05-09 RX ORDER — FUROSEMIDE 40 MG/1
40 TABLET ORAL
Refills: 0 | Status: DISCONTINUED | COMMUNITY
End: 2022-05-09

## 2022-05-09 RX ORDER — FLASH GLUCOSE SENSOR
KIT MISCELLANEOUS
Qty: 2 | Refills: 9 | Status: ACTIVE | COMMUNITY
Start: 2022-05-09 | End: 1900-01-01

## 2022-05-09 RX ORDER — METOPROLOL SUCCINATE 50 MG/1
50 TABLET, EXTENDED RELEASE ORAL DAILY
Refills: 1 | Status: ACTIVE | COMMUNITY
Start: 2022-05-09

## 2022-05-09 RX ORDER — METOPROLOL SUCCINATE 25 MG/1
25 TABLET, EXTENDED RELEASE ORAL
Refills: 0 | Status: DISCONTINUED | COMMUNITY
End: 2022-05-09

## 2022-05-09 RX ORDER — FLASH GLUCOSE SCANNING READER
EACH MISCELLANEOUS
Qty: 1 | Refills: 0 | Status: ACTIVE | COMMUNITY
Start: 2022-05-09 | End: 1900-01-01

## 2022-05-09 RX ORDER — LANCETS 33 GAUGE
EACH MISCELLANEOUS
Qty: 270 | Refills: 3 | Status: ACTIVE | COMMUNITY
Start: 2022-05-09 | End: 1900-01-01

## 2022-05-09 RX ORDER — PEN NEEDLE, DIABETIC 29 G X1/2"
32G X 4 MM NEEDLE, DISPOSABLE MISCELLANEOUS
Qty: 90 | Refills: 1 | Status: ACTIVE | COMMUNITY
Start: 2022-05-09 | End: 1900-01-01

## 2022-05-09 RX ORDER — SPIRONOLACTONE 25 MG/1
25 TABLET ORAL
Refills: 0 | Status: DISCONTINUED | COMMUNITY
End: 2022-05-09

## 2022-05-09 RX ORDER — BLOOD SUGAR DIAGNOSTIC
STRIP MISCELLANEOUS DAILY
Qty: 270 | Refills: 0 | Status: ACTIVE | COMMUNITY
Start: 2022-05-09 | End: 1900-01-01

## 2022-05-09 RX ORDER — ATORVASTATIN CALCIUM 40 MG/1
40 TABLET, FILM COATED ORAL
Refills: 0 | Status: ACTIVE | COMMUNITY
Start: 2022-05-09

## 2022-05-09 RX ORDER — LISINOPRIL 10 MG/1
10 TABLET ORAL DAILY
Qty: 90 | Refills: 3 | Status: ACTIVE | COMMUNITY
Start: 2022-05-09

## 2022-05-09 NOTE — PHYSICAL EXAM
[Alert] : alert [Well Nourished] : well nourished [No Acute Distress] : no acute distress [No Neck Mass] : no neck mass was observed [Thyroid Not Enlarged] : the thyroid was not enlarged [No Respiratory Distress] : no respiratory distress [No Accessory Muscle Use] : no accessory muscle use [Clear to Auscultation] : lungs were clear to auscultation bilaterally [Normal PMI] : the apical impulse was normal [Normal S1, S2] : normal S1 and S2 [Normal Rate] : heart rate was normal [Regular Rhythm] : with a regular rhythm [Normal Bowel Sounds] : normal bowel sounds [Not Tender] : non-tender [Soft] : abdomen soft

## 2022-05-09 NOTE — REVIEW OF SYSTEMS
[Fatigue] : fatigue [Heartburn] : heartburn [Negative] : Genitourinary [Decreased Appetite] : appetite not decreased [Nausea] : no nausea [Constipation] : no constipation [Abdominal Pain] : no abdominal pain [Vomiting] : no vomiting [Diarrhea] : no diarrhea [FreeTextEntry8] : no urinary symptoms

## 2022-05-09 NOTE — ASSESSMENT
[FreeTextEntry1] : 81 year old female presents for initial patient consult s/p hospital discharge for new onset Type 2DM, with recent A1c 11.1%.\par \par 1. Newly diagnosed diabetes DM 2\par Likely not true DKA- her BHOB can be due to starvation ketosis and her bicarb was normal (which would be expected to be low in DKA)\par A1c 11.1%, goal approximately 7-8%\par Cpeptide resulted DETECTABLE at 3.3\par \par c/w:\par Lantus 25\par MF 500mg two tabs BID (TDD 2000mg daily)\par Discussed hypoglycemia, low threshold for this patient <80, call me if any lows. \par Decrease LAntus insulin by 2 units if have any readings <80 and call me to notify me. \par Discussed signs and symptoms of hypoglycemia and treatment. \par Check UACR and CMP. \par To pay out of pocket for cindy. \par \par 2. HTN\par Goal BP in DM <130/80\par Metoprolol ER 50\par Lisinopril 10\par /76 mmHg\par all home BP monitoring at goal. \par \par 3. HLD\par LDL 83\par \par rtc Dr. Araujo 3 months.

## 2022-05-09 NOTE — HISTORY OF PRESENT ILLNESS
[FreeTextEntry1] : 81 year old female presents for initial patient consult s/p hospital discharge for new onset Type 2DM, with recent A1c 11.1%.\par \par Primary Language: Greenlandic, daughter dinah is here to translate. Declines interpretation \par \par Hospital Course:\par 81 year old female PMH pacemaker, endometrial adenocarcinoma s/p hysterectomy in Jan and radiation 1 wks ago, HTN, GERD, CHF presents with nausea, loss of appetite, mouth dry and acid reflux. Went to PMD initially found to be hyperglycemic to 598 and hypertensive. Metoprolol 25mg increased to 50mg bid. Eating poorly. Seen by GYN  found to have a bladder infection. Started on Cipro and cephalexin. Endocrine called for DM (new diagnosis) for the pt and a1c 11.1\par \par Diabetes Disease course:\par New onset, history of occasional fasting glucose issues but no overt diagnosis of DM 2.  She does recall the primary care doctor mentioning issues with glucose in the past. \par \par Current Medications \par  Lantus Solostar PEN 25 units SQ qHS \par  Metformin 500 mg two tabs BID\par \par Meter Readings:\par One Touch Verio Reflect\par AM: 109 ,124, 130, 128 118, 103, 130, 136\par Pre-lunch: 123, 103, 130, 135, 136, 124, 115, 103\par Pre-dinner: 125, 88, 149, 126, 158, 147, 150, 123\par \par Diet: Limited recently\par Has cut back on high carbs, met with RD and following diabetic diet\par Eats chicken, fish vegetables\par \par Exercise: walks\par \par Optho: needs to go, refer to optho\par \par Podiatry: goes every 6 months to podiatrist, she defers foot exam today\par Goes to Dr. Greyson Alegre\par \par \par Labs inpatient:\par A1c 11.1%\par \par 1. Newly diagnosed diabetes, likely DM 2\par Likely not true DKA- her BHOB can be due to starvation ketosis\par and her bicarb was normal (which would be expected to be low in DKA)\par A1c 11.1%, goal approximately 7-8%\par Cpeptide resulted DETECTABLE at 3.3\par \par 2. HTN\par Goal BP in DM <130/80\par lisinopril 10 milliGRAM(s) Oral daily\par metoprolol succinate ER 50 milliGRAM(s) Oral daily\par \par \par 3. HLD\par LDL 83\par \par Patient Report:\par Patient feeling very well. \par Has a radiation follow up on 5/13/22. \par BP at home always 130s/80smmHg

## 2022-05-13 ENCOUNTER — APPOINTMENT (OUTPATIENT)
Dept: RADIATION ONCOLOGY | Facility: CLINIC | Age: 82
End: 2022-05-13
Payer: MEDICARE

## 2022-05-13 VITALS
TEMPERATURE: 98.1 F | BODY MASS INDEX: 43.31 KG/M2 | RESPIRATION RATE: 18 BRPM | HEIGHT: 61 IN | SYSTOLIC BLOOD PRESSURE: 155 MMHG | DIASTOLIC BLOOD PRESSURE: 73 MMHG | HEART RATE: 66 BPM | WEIGHT: 229.39 LBS | OXYGEN SATURATION: 66 %

## 2022-05-13 PROCEDURE — 99214 OFFICE O/P EST MOD 30 MIN: CPT | Mod: 25

## 2022-05-13 NOTE — REVIEW OF SYSTEMS
[Constipation: Grade 0] : Constipation: Grade 0 [Diarrhea: Grade 0] : Diarrhea: Grade 0 [Nausea: Grade 0] : Nausea: Grade 0 [Vomiting: Grade 0] : Vomiting: Grade 0 [Fatigue: Grade 1 - Fatigue relieved by rest] : Fatigue: Grade 1 - Fatigue relieved by rest [Hematuria: Grade 0] : Hematuria: Grade 0 [Urinary Incontinence: Grade 0] : Urinary Incontinence: Grade 0  [Urinary Retention: Grade 0] : Urinary Retention: Grade 0 [Urinary Tract Pain: Grade 0] : Urinary Tract Pain: Grade 0 [Urinary Urgency: Grade 0] : Urinary Urgency: Grade 0 [Urinary Frequency: Grade 0] : Urinary Frequency: Grade 0

## 2022-05-15 NOTE — HISTORY OF PRESENT ILLNESS
[FreeTextEntry1] : Ms. Yeh is an 81 woman s/p comprehensive surgical staging for a pT1bN0 G1 endometrial cancer with LVSI. She did have b/l negative LN sampling followed by VBT completed 4/25/22. \par \par History of Present Illness:\par 9/16/21 - Presented to Dr. Joy (GYN) for annual exam. \par 10/26/21 - US: b/l ovarian cysts, EMS 5mm.\par 12/7/21 - f/u US: stable b/l ovarian cysts as well as thickening of EMS to 10mm.\par 12/21/21 - Endometrial biopsy: FIGO grade 1 endometrial cancer. \par \par 2/10/22 - RA TLH, BSO SLN mapping and sampling: G1 endometrial CA, 11/16 mm MMI, LVI, no CSI, BRANDON involved, pT1bN0. 0/2 right and 0/3 left pelvic nodes. ER+ loss of PMS2 and MLH1.\par \par 3/29/22 - 4/5/22 - VBT to a total dose of 2,100 cGy in 3 fractions. \par \par She presents today for post-treatment evaluation. Recently was hospitalized for new diagnosis of DM and during hospitalization lso had UTI and vaginal candiasis.  Saw Endocronology for new diagnosis. \par \par She states UTI symptoms have resolved. Rash to b/l groin creases has improved gradually. Continues to have redness and itching as well as some swelling in b/l ankles.

## 2022-05-15 NOTE — PHYSICAL EXAM
[Normal] : well developed, well nourished, in no acute distress [Sclera] : the sclera and conjunctiva were normal [] : no respiratory distress [No Focal Deficits] : no focal deficits [Normal External Genitalia] : normal external genitalia  [Normal Vaginal Cuff] : vaginal cuff without lesion or nodularity [de-identified] : atleast 1+ pitting edema

## 2022-07-15 ENCOUNTER — RX RENEWAL (OUTPATIENT)
Age: 82
End: 2022-07-15

## 2022-07-19 ENCOUNTER — APPOINTMENT (OUTPATIENT)
Dept: RADIATION ONCOLOGY | Facility: CLINIC | Age: 82
End: 2022-07-19

## 2022-07-19 VITALS
WEIGHT: 221.78 LBS | DIASTOLIC BLOOD PRESSURE: 74 MMHG | BODY MASS INDEX: 41.87 KG/M2 | TEMPERATURE: 96.98 F | HEART RATE: 89 BPM | RESPIRATION RATE: 18 BRPM | OXYGEN SATURATION: 93 % | SYSTOLIC BLOOD PRESSURE: 116 MMHG | HEIGHT: 61 IN

## 2022-07-19 PROCEDURE — 99212 OFFICE O/P EST SF 10 MIN: CPT

## 2022-07-19 RX ORDER — CLOTRIMAZOLE AND BETAMETHASONE DIPROPIONATE 10; .5 MG/G; MG/G
1-0.05 CREAM TOPICAL
Qty: 45 | Refills: 0 | Status: DISCONTINUED | COMMUNITY
Start: 2022-04-18

## 2022-07-19 RX ORDER — METFORMIN HYDROCHLORIDE 500 MG/1
500 TABLET, COATED ORAL
Qty: 180 | Refills: 0 | Status: DISCONTINUED | COMMUNITY
Start: 2022-04-19

## 2022-07-19 RX ORDER — CEPHALEXIN 500 MG/1
500 CAPSULE ORAL
Qty: 14 | Refills: 0 | Status: DISCONTINUED | COMMUNITY
Start: 2022-04-18

## 2022-07-19 RX ORDER — ONDANSETRON 4 MG/1
4 TABLET, ORALLY DISINTEGRATING ORAL
Qty: 30 | Refills: 1 | Status: DISCONTINUED | COMMUNITY
Start: 2022-03-31 | End: 2022-07-19

## 2022-07-19 RX ORDER — FLUTICASONE PROPIONATE 50 UG/1
50 SPRAY, METERED NASAL
Qty: 16 | Refills: 0 | Status: DISCONTINUED | COMMUNITY
Start: 2022-03-24

## 2022-07-19 RX ORDER — METRONIDAZOLE 7.5 MG/G
0.75 GEL VAGINAL
Qty: 70 | Refills: 0 | Status: DISCONTINUED | COMMUNITY
Start: 2022-04-25

## 2022-07-19 RX ORDER — FLUCONAZOLE 150 MG/1
150 TABLET ORAL
Qty: 1 | Refills: 0 | Status: DISCONTINUED | COMMUNITY
Start: 2022-04-18

## 2022-07-19 RX ORDER — ISOPROPYL ALCOHOL 70 ML/100ML
70 SWAB TOPICAL
Qty: 100 | Refills: 0 | Status: DISCONTINUED | COMMUNITY
Start: 2022-04-21

## 2022-07-19 RX ORDER — OXYCODONE 5 MG/1
5 TABLET ORAL
Qty: 6 | Refills: 0 | Status: DISCONTINUED | COMMUNITY
Start: 2022-02-11

## 2022-07-19 RX ORDER — FAMOTIDINE 40 MG/1
40 TABLET, FILM COATED ORAL
Qty: 90 | Refills: 0 | Status: ACTIVE | COMMUNITY
Start: 2022-03-14

## 2022-07-19 RX ORDER — NYSTATIN AND TRIAMCINOLONE ACETONIDE 100000; 1 MG/G; MG/G
100000-0.1 CREAM TOPICAL 3 TIMES DAILY
Qty: 1 | Refills: 0 | Status: DISCONTINUED | COMMUNITY
Start: 2022-05-10 | End: 2022-07-19

## 2022-07-19 RX ORDER — CIPROFLOXACIN HYDROCHLORIDE 250 MG/1
250 TABLET, FILM COATED ORAL
Qty: 14 | Refills: 0 | Status: DISCONTINUED | COMMUNITY
Start: 2022-04-18

## 2022-07-19 RX ORDER — NYSTATIN 100000 [USP'U]/G
100000 CREAM TOPICAL
Qty: 30 | Refills: 0 | Status: DISCONTINUED | COMMUNITY
Start: 2022-04-23

## 2022-07-19 RX ORDER — TRIAMCINOLONE ACETONIDE 1 MG/G
0.1 CREAM TOPICAL
Qty: 80 | Refills: 0 | Status: DISCONTINUED | COMMUNITY
Start: 2022-02-28

## 2022-07-21 ENCOUNTER — RESULT REVIEW (OUTPATIENT)
Age: 82
End: 2022-07-21

## 2022-07-22 ENCOUNTER — NON-APPOINTMENT (OUTPATIENT)
Age: 82
End: 2022-07-22

## 2022-07-28 ENCOUNTER — OUTPATIENT (OUTPATIENT)
Dept: OUTPATIENT SERVICES | Facility: HOSPITAL | Age: 82
LOS: 1 days | End: 2022-07-28
Payer: MEDICARE

## 2022-07-28 ENCOUNTER — APPOINTMENT (OUTPATIENT)
Dept: CT IMAGING | Facility: IMAGING CENTER | Age: 82
End: 2022-07-28

## 2022-07-28 DIAGNOSIS — Z95.818 PRESENCE OF OTHER CARDIAC IMPLANTS AND GRAFTS: Chronic | ICD-10-CM

## 2022-07-28 DIAGNOSIS — Z95.2 PRESENCE OF PROSTHETIC HEART VALVE: Chronic | ICD-10-CM

## 2022-07-28 DIAGNOSIS — C54.1 MALIGNANT NEOPLASM OF ENDOMETRIUM: ICD-10-CM

## 2022-07-28 DIAGNOSIS — Z90.49 ACQUIRED ABSENCE OF OTHER SPECIFIED PARTS OF DIGESTIVE TRACT: Chronic | ICD-10-CM

## 2022-07-28 DIAGNOSIS — Z95.0 PRESENCE OF CARDIAC PACEMAKER: Chronic | ICD-10-CM

## 2022-07-28 PROCEDURE — 74176 CT ABD & PELVIS W/O CONTRAST: CPT | Mod: 26,MH

## 2022-07-28 PROCEDURE — 74176 CT ABD & PELVIS W/O CONTRAST: CPT

## 2022-07-28 NOTE — REVIEW OF SYSTEMS
[Negative] : Heme/Lymph [Constipation: Grade 0] : Constipation: Grade 0 [Diarrhea: Grade 0] : Diarrhea: Grade 0 [Nausea: Grade 0] : Nausea: Grade 0 [Vomiting: Grade 0] : Vomiting: Grade 0 [Fatigue: Grade 1 - Fatigue relieved by rest] : Fatigue: Grade 1 - Fatigue relieved by rest [Hematuria: Grade 0] : Hematuria: Grade 0 [Urinary Tract Pain: Grade 0] : Urinary Tract Pain: Grade 0 [Urinary Urgency: Grade 0] : Urinary Urgency: Grade 0 [Urinary Frequency: Grade 0] : Urinary Frequency: Grade 0 [Chest Pain] : no chest pain [Palpitations] : no palpitations [Leg Claudication] : no intermittent leg claudication [Shortness Of Breath] : no shortness of breath [Wheezing] : no wheezing [SOB on Exertion] : no shortness of breath during exertion [Abdominal Pain] : no abdominal pain [Vomiting] : no vomiting [Constipation] : no constipation [Diarrhea] : no diarrhea [Urinary Frequency] : no change in urinary frequency [Vaginal Discharge] : no vaginal discharge [FreeTextEntry5] : +swelling to bilateral ankles unchanged  [FreeTextEntry6] : +intermittent cough at night [FreeTextEntry7] : +intermittent aching in the b/l groin, intermittent upset stomach and nausea without vomiting  [de-identified] : intermittent nausea/feeling of upset stomach without vomiting

## 2022-07-28 NOTE — PHYSICAL EXAM
[General Appearance - Well Developed] : well developed [General Appearance - Well Nourished] : well nourished [General Appearance - Alert] : alert [General Appearance - In No Acute Distress] : in no acute distress [Obese] : obese [Extraocular Movements] : extraocular movements were intact [Hearing Threshold Finger Rub Not Lee] : hearing was normal [] : no respiratory distress [Exaggerated Use Of Accessory Muscles For Inspiration] : no accessory muscle use [Musculoskeletal - Swelling] : no joint swelling [Range of Motion to Joints] : range of motion to joints [Normal] : oriented to person, place and time, the affect was normal, the mood was normal and not anxious [Abdomen Soft] : soft [Normal External Genitalia] : normal external genitalia  [Normal Vaginal Cuff] : vaginal cuff without lesion or nodularity [Supraclavicular Lymph Nodes Enlarged Bilaterally] : supraclavicular [Inguinal Lymph Nodes Enlarged Bilaterally] : inguinal [de-identified] : mild edema to bilateral ankles without tenderness to palpation, no calf tenderness, extremities warm to touch

## 2022-07-28 NOTE — VITALS
[Pain Description/Quality: ___] : Pain description/quality: [unfilled] [Pain Duration: ___] : Pain duration: [unfilled] [Pain Location: ___] : Pain Location: [unfilled] [NoTreatment Scheduled] : no treatment scheduled [Pain Interferes with ADLs] : Pain does not interfere with activities of daily living

## 2022-07-28 NOTE — REASON FOR VISIT
[Family Member] : family member [Other: ______] : provided by MARTHA [Interpreters_Relationshiptopatient] : D [TWNoteComboBox1] : Maur[place]

## 2022-07-28 NOTE — HISTORY OF PRESENT ILLNESS
[FreeTextEntry1] : Ms. VIEYRA is an 82 year old female with Stage IB Endometrioid carcinoma, FIGO grade 1, with LVSI who underwent surgery followed by radiation therapy.  \par \par History of Present Illness: \par \par 9/16/21 - Presented to Dr. Joy (GYN) for annual exam.  \par \par 10/26/21 - US: b/l ovarian cysts, EMS 5mm. \par \par 12/7/21 - Follow-up US: stable b/l ovarian cysts as well as thickening of EMS to 10mm. \par \par 12/21/21 - Endometrial biopsy: FIGO grade 1 endometrial cancer.    \par \par 2/10/22 - RA TLH, BSO SLN mapping and sampling: G1 endometrial CA, 11/16 mm MMI, LVI, no CSI, BRANDON involved, pT1bN0. 0/2 right and 0/3 left pelvic nodes. ER+ loss of PMS2 and MLH1.   \par \par 3/29/22 - 4/5/22 - VBT to a total dose of 2,100 cGy in 3 fractions.    \par \par 5/13/22 - Post-treatment evaluation. Recently was hospitalized for new diagnosis of DM and during hospitalization lso had UTI and vaginal candiasis. Saw Endocronology for new diagnosis. She states UTI symptoms have resolved. Rash to b/l groin creases has improved gradually. Continues to have redness and itching as well as some swelling in b/l ankles. \par \par 7/19/22 - She presents today for routine follow-up.  She notes feeling overall well but with some intermittent groin discomfort that she describes as a dull ache and resolves spontaneously. She notes persistent fatigue since completion of radiation without interference of ADLs. Admits to feeling of upset/weak stomach without emesis. Ankle swelling unchanged without associated tenderness to palpation. Denies fever/chills, SOB, cough, diarrhea/constipation, changes to bowel habits or urination. Scheduled to see Dr. Reis 8/5/22. Has not had interval imaging.

## 2022-08-05 ENCOUNTER — APPOINTMENT (OUTPATIENT)
Dept: GYNECOLOGIC ONCOLOGY | Facility: CLINIC | Age: 82
End: 2022-08-05

## 2022-08-05 VITALS
HEART RATE: 79 BPM | DIASTOLIC BLOOD PRESSURE: 59 MMHG | WEIGHT: 222 LBS | BODY MASS INDEX: 41.91 KG/M2 | SYSTOLIC BLOOD PRESSURE: 129 MMHG | HEIGHT: 61 IN

## 2022-08-05 PROCEDURE — 99213 OFFICE O/P EST LOW 20 MIN: CPT

## 2022-08-05 NOTE — DISCUSSION/SUMMARY
[FreeTextEntry1] : Signs of symptoms of recurrence discussed \par Mammogram recommendation discussed\par due for colonoscopy\par RTO in 6 onths

## 2022-08-05 NOTE — HISTORY OF PRESENT ILLNESS
[FreeTextEntry1] : Surgery 2/10/22- RTLB/BSO/SLNM\par \par Stage IB endometrioid adenocarcinoma, \par Completed 3 treatments of VBT\par \par She has complaints of left groin pain, recent CT AP- WNL 7/2022\par \par She denies abdominal/pelvic pain, dyspnea or chest pain, vaginal bleeding or discharge, nausea/vomiting, changes in bowel habits or urination, lower extremity edema or pain.

## 2022-08-25 ENCOUNTER — APPOINTMENT (OUTPATIENT)
Dept: ENDOCRINOLOGY | Facility: CLINIC | Age: 82
End: 2022-08-25

## 2022-08-25 VITALS
SYSTOLIC BLOOD PRESSURE: 140 MMHG | DIASTOLIC BLOOD PRESSURE: 80 MMHG | HEART RATE: 79 BPM | TEMPERATURE: 205.88 F | WEIGHT: 223 LBS | BODY MASS INDEX: 42.14 KG/M2 | OXYGEN SATURATION: 97 %

## 2022-08-25 DIAGNOSIS — R53.83 OTHER FATIGUE: ICD-10-CM

## 2022-08-25 PROCEDURE — 95251 CONT GLUC MNTR ANALYSIS I&R: CPT

## 2022-08-25 PROCEDURE — 99204 OFFICE O/P NEW MOD 45 MIN: CPT | Mod: 25

## 2022-08-25 PROCEDURE — 83036 HEMOGLOBIN GLYCOSYLATED A1C: CPT | Mod: QW

## 2022-08-25 RX ORDER — SITAGLIPTIN 100 MG/1
100 TABLET, FILM COATED ORAL
Qty: 90 | Refills: 3 | Status: ACTIVE | COMMUNITY
Start: 2022-08-25 | End: 1900-01-01

## 2022-08-26 LAB
CREAT SPEC-SCNC: 121 MG/DL
HBA1C MFR BLD HPLC: 5.3
MICROALBUMIN 24H UR DL<=1MG/L-MCNC: 1.4 MG/DL
MICROALBUMIN/CREAT 24H UR-RTO: 11 MG/G
TSH SERPL-ACNC: 2.04 UIU/ML

## 2022-09-06 ENCOUNTER — RX RENEWAL (OUTPATIENT)
Age: 82
End: 2022-09-06

## 2022-09-26 ENCOUNTER — RESULT REVIEW (OUTPATIENT)
Age: 82
End: 2022-09-26

## 2022-11-29 ENCOUNTER — RX RENEWAL (OUTPATIENT)
Age: 82
End: 2022-11-29

## 2022-11-29 RX ORDER — METFORMIN ER 500 MG 500 MG/1
500 TABLET ORAL
Qty: 360 | Refills: 2 | Status: ACTIVE | COMMUNITY
Start: 2022-05-09 | End: 1900-01-01

## 2023-01-19 ENCOUNTER — APPOINTMENT (OUTPATIENT)
Dept: RADIATION ONCOLOGY | Facility: CLINIC | Age: 83
End: 2023-01-19
Payer: MEDICARE

## 2023-01-19 VITALS
TEMPERATURE: 97.16 F | HEART RATE: 70 BPM | OXYGEN SATURATION: 95 % | SYSTOLIC BLOOD PRESSURE: 148 MMHG | WEIGHT: 220 LBS | BODY MASS INDEX: 41.54 KG/M2 | RESPIRATION RATE: 17 BRPM | DIASTOLIC BLOOD PRESSURE: 78 MMHG | HEIGHT: 61 IN

## 2023-01-19 DIAGNOSIS — Z92.3 PERSONAL HISTORY OF IRRADIATION: ICD-10-CM

## 2023-01-19 DIAGNOSIS — Z85.42 ENCOUNTER FOR FOLLOW-UP EXAMINATION AFTER COMPLETED TREATMENT FOR MALIGNANT NEOPLASM: ICD-10-CM

## 2023-01-19 DIAGNOSIS — Z08 ENCOUNTER FOR FOLLOW-UP EXAMINATION AFTER COMPLETED TREATMENT FOR MALIGNANT NEOPLASM: ICD-10-CM

## 2023-01-19 PROCEDURE — 99212 OFFICE O/P EST SF 10 MIN: CPT

## 2023-01-27 PROBLEM — Z92.3 HISTORY OF BRACHYTHERAPY: Status: ACTIVE | Noted: 2022-07-28

## 2023-01-27 PROBLEM — Z08 ENCOUNTER FOR FOLLOW-UP SURVEILLANCE OF UTERINE CANCER: Status: ACTIVE | Noted: 2023-01-27

## 2023-01-27 NOTE — END OF VISIT
Hospitalist Progress Note      PCP: Apollo Elias DO    Date of Admission: 3/25/2022    Subjective: had dialysis today, hb stable    Medications:  Reviewed    Infusion Medications    sodium chloride      dextrose      sodium chloride       Scheduled Medications    pantoprazole  40 mg Oral BID AC    midodrine  5 mg Oral TID WC    [Held by provider] sacubitril-valsartan  0.5 tablet Oral BID    epoetin roseann-epbx  20,000 Units SubCUTAneous Once per day on Mon Wed Fri    iron sucrose  100 mg IntraVENous Weekly    insulin lispro  0-6 Units SubCUTAneous TID     insulin lispro  0-3 Units SubCUTAneous Nightly    [Held by provider] amiodarone  200 mg Oral BID    metoprolol succinate  25 mg Oral Daily    allopurinol  100 mg Oral Daily    atorvastatin  80 mg Oral Daily    levothyroxine  50 mcg Oral QAM AC    rivaroxaban  15 mg Oral Dinner    sodium chloride flush  5-40 mL IntraVENous 2 times per day    [Held by provider] aspirin  81 mg Oral Daily     PRN Meds: midodrine, acetaminophen, sodium chloride, glucose, dextrose, glucagon (rDNA), dextrose, sodium chloride flush, sodium chloride, perflutren lipid microspheres, ondansetron      Intake/Output Summary (Last 24 hours) at 4/12/2022 2324  Last data filed at 4/12/2022 2058  Gross per 24 hour   Intake 770 ml   Output 1400 ml   Net -630 ml       Physical Exam Performed:    /66   Pulse 66   Temp 98.1 °F (36.7 °C) (Oral)   Resp 21   Ht 5' 6\" (1.676 m)   Wt 154 lb 5.2 oz (70 kg)   SpO2 95%   BMI 24.91 kg/m²       General appearance: No apparent distress, appears stated age and cooperative. HEENT: Pupils equal, round, and reactive to light. Conjunctivae/corneas clear. Neck: Supple, with full range of motion. No jugular venous distention. Trachea midline. Respiratory:  Normal respiratory effort. Clear to auscultation, bilaterally without Rales/Wheezes/Rhonchi.   Cardiovascular: Regular rate and rhythm with normal S1/S2 without murmurs, rubs or gallops. Abdomen: Soft, non-tender, non-distended with normal bowel sounds. Musculoskeletal: Bilateral edema noted  Skin: Skin color, texture, turgor normal.  No rashes or lesions. Neurologic:  Neurovascularly intact without any focal sensory/motor deficits. Cranial nerves: II-XII intact, grossly non-focal.  Psychiatric: Alert  Capillary Refill: Brisk,3 seconds, normal   Peripheral Pulses: +2 palpable, equal bilaterally        Labs:   Recent Labs     04/10/22  0801 04/11/22  1120 04/12/22  0751   WBC 6.4 5.6 5.5   HGB 7.9* 8.5* 9.0*   HCT 24.4* 26.1* 27.0*    231 256     Recent Labs     04/11/22  0340 04/11/22  1345 04/12/22  0751   * 136 136   K 4.8 4.1 4.7   CL 97* 100 100   CO2 20* 22 21   BUN 82* 41* 50*   CREATININE 3.5* 2.2* 3.2*   CALCIUM 9.2 8.9 9.2     Recent Labs     04/10/22  0800 04/11/22  1345 04/12/22  0751   AST 17 19 19   ALT 14 15 15   BILITOT 0.7 1.0 0.7   ALKPHOS 119 117 128     No results for input(s): INR in the last 72 hours. No results for input(s): Zoltan Snare in the last 72 hours. Urinalysis:      Lab Results   Component Value Date    NITRU Negative 04/10/2022    WBCUA 0-2 04/10/2022    BACTERIA 2+ 04/09/2022    RBCUA 0-2 04/10/2022    BLOODU Negative 04/10/2022    SPECGRAV 1.025 04/10/2022    GLUCOSEU Negative 04/10/2022       Radiology:  IR TUNNELED CVC PLACE WO SQ PORT/PUMP > 5 YEARS   Final Result   Successful conversion of a non tunneled hemodialysis catheter for a new 19   centimeter tunneled hemodialysis catheter. IR NONTUNNELED VASCULAR CATHETER > 5 YEARS   Final Result   Successful ultrasound and fluoroscopy guided right IJ 15 cm non-tunneled   hemodialysis catheter placement. CT ABDOMEN PELVIS WO CONTRAST Additional Contrast? None   Final Result   There is marked cardiomegaly, with trace pleural effusions, as well as   moderate intra-abdominal and pelvic ascites, as well as diffuse anasarca.    Hepatic vein reflux is noted on the previous CT examination is well. This   combination of findings can be seen in the setting of passive liver   congestion related to congestive failure. Questionable mild surface   nodularity of the liver makes it difficult to exclude underlying cirrhosis. Minimal bibasilar airspace disease, possibly related to atelectasis or   pneumonia, though with less consolidation than seen on the previous exam.      Mild appearing bilateral renal atrophy. Suspected fat and ascitic fluid containing left inguinal hernia. No bowel   herniation. XR CHEST (2 VW)   Final Result      1. Possible mild degree of vascular congestion though exaggerated by a   suboptimal inspiration. No focal pulmonary infiltrates are noted. 2.  Stable cardiomegaly. CT HEAD WO CONTRAST   Final Result      1. No acute intracranial abnormality noted. 2.  Prominence of the sulci and/or CSF spaces suggests a possible mild degree   of cerebral atrophy. Assessment/Plan:    Active Hospital Problems    Diagnosis     Ventricular tachycardia ([address] [I47.2]     Syncope [R55]     CHF (congestive heart failure) ([facility]) [I50.9]     Syncope and collapse [R55]     ICD (implantable cardioverter-defibrillator) in place [Z95.810]     Demand ischemia ([address] [I24.8]     Acute on chronic systolic heart failure, NYHA class 4 ([facility]) [I50.23]     Stage 3 chronic kidney disease ([address] [N18.30]     Diabetes type 2, uncontrolled ([address] [E11.65]     Essential hypertension, benign [I10]             Acute on chronic systolic heart failure exacerbation. Ejection fraction of less than 20%, patient is followed closely by cardiology and nephrology.   Started on milrinone 3/31 as well as Lasix infusion (later discontinued 4/3 due to uremia).   Plan :  -Appreciate cardiology input.  -hold Lasix and milrinone   -started on dialysis, outpt dialysis session scheduled - done  - holding dialysis today 2/2 hypotension     SONAL on CKD  Cardiorenal, creatinine increasing to 2.8--> 3--> 3.1--> 3.3-->3.7. Multiple discussions nephrology input highly appreciated,   Line placed and started on dialysis  S/p tunneled line placement on Friday - holding xarelto     Anemia - hb dropped from 7.0-->6.7, pt refused blood transfusion. ?oozing from IV access while on xarelto. Hold xarelto. Check occult blood, IV venofer and epocrit  Received 1 unit blood, hb improved from 6.6-->9.0-->7.7  Positive occult stool  GI consulted - appr recs - no plans for procedure given EF     Hypotension - BP dropped, hold entresto, BB with holding parameter, hold amio. Holding dialysis today. added midodrine TID yesterday and plan dialysis today     Syncope. Ventricular tachycardia. Interrogation showed VT that was appropriately shocked. Continue amiodarone and metoprolol. Appreciate cardiology input. Holding xarelto     CAD. Continue aspirin beta-blocker and statin.     Left ventricular thrombus. hold Xarelto-->resume AC when ok with everyone     Diabetes mellitus. Sliding scale        DVT Prophylaxis: xarelto  Diet: ADULT DIET; Regular; 4 carb choices (60 gm/meal);  Low Sodium (2 gm)  Code Status: Full Code    PT/OT Eval Status: ordered    Dispo - resumed AC, monitor hb and dc in am if stable    Ra Proctor MD [Time Spent: ___ minutes] : I have spent [unfilled] minutes of time on the encounter.

## 2023-01-27 NOTE — VITALS
[Pain Description/Quality: ___] : Pain description/quality: [unfilled] [Pain Duration: ___] : Pain duration: [unfilled] [Pain Location: ___] : Pain Location: [unfilled] [NoTreatment Scheduled] : no treatment scheduled [80: Normal activity with effort; some signs or symptoms of disease.] : 80: Normal activity with effort; some signs or symptoms of disease.  [Least Pain Intensity: 0/10] : 0/10 [Pain Interferes with ADLs] : Pain does not interfere with activities of daily living

## 2023-01-27 NOTE — PHYSICAL EXAM
[General Appearance - Well Developed] : well developed [General Appearance - Well Nourished] : well nourished [General Appearance - Alert] : alert [General Appearance - In No Acute Distress] : in no acute distress [Obese] : obese [Extraocular Movements] : extraocular movements were intact [Hearing Threshold Finger Rub Not [place]] : hearing was normal [] : no respiratory distress [Exaggerated Use Of Accessory Muscles For Inspiration] : no accessory muscle use [Abdomen Soft] : soft [Normal External Genitalia] : normal external genitalia  [Normal Vaginal Cuff] : vaginal cuff without lesion or nodularity [Supraclavicular Lymph Nodes Enlarged Bilaterally] : supraclavicular [Inguinal Lymph Nodes Enlarged Bilaterally] : inguinal [Musculoskeletal - Swelling] : no joint swelling [Range of Motion to Joints] : range of motion to joints [Normal] : oriented to person, place and time, the affect was normal, the mood was normal and not anxious [No Rectal Mass] : no rectal mass [de-identified] : mild edema to bilateral ankles without tenderness to palpation, no calf tenderness, extremities warm to touch

## 2023-01-27 NOTE — REASON FOR VISIT
[Routine Follow-Up] : routine follow-up visit for [Endometrial Cancer] : endometrial cancer [Family Member] : family member [Other: ______] : provided by MARTHA [Interpreters_Relationshiptopatient] : D [TWNoteComboBox1] : [place]

## 2023-01-27 NOTE — REVIEW OF SYSTEMS
[Negative] : Heme/Lymph [Constipation: Grade 0] : Constipation: Grade 0 [Diarrhea: Grade 0] : Diarrhea: Grade 0 [Nausea: Grade 0] : Nausea: Grade 0 [Vomiting: Grade 0] : Vomiting: Grade 0 [Fatigue: Grade 1 - Fatigue relieved by rest] : Fatigue: Grade 1 - Fatigue relieved by rest [Hematuria: Grade 0] : Hematuria: Grade 0 [Urinary Tract Pain: Grade 0] : Urinary Tract Pain: Grade 0 [Urinary Urgency: Grade 0] : Urinary Urgency: Grade 0 [Urinary Frequency: Grade 0] : Urinary Frequency: Grade 0 [Chest Pain] : no chest pain [Palpitations] : no palpitations [Leg Claudication] : no intermittent leg claudication [Shortness Of Breath] : no shortness of breath [Wheezing] : no wheezing [SOB on Exertion] : no shortness of breath during exertion [Abdominal Pain] : no abdominal pain [Vomiting] : no vomiting [Constipation] : no constipation [Diarrhea] : no diarrhea [Urinary Frequency] : no change in urinary frequency [Vaginal Discharge] : no vaginal discharge [FreeTextEntry5] : +swelling to bilateral ankles unchanged  [FreeTextEntry6] : +intermittent cough at night [FreeTextEntry7] : +intermittent aching in the b/l groin, intermittent upset stomach and nausea without vomiting  [de-identified] : intermittent nausea/feeling of upset stomach without vomiting  [Urinary Urgency: Grade 1 - Present] : Urinary Urgency: Grade 1 - Present [Urinary Frequency: Grade 1 - Present] : Urinary Frequency: Grade 1 - Present

## 2023-01-27 NOTE — DISEASE MANAGEMENT
[Pathological] : TNM Stage: p [IB] : IB [FreeTextEntry4] : uterine cancer [TTNM] : 1b [NTNM] : 0 [MTNM] : x

## 2023-01-27 NOTE — HISTORY OF PRESENT ILLNESS
[FreeTextEntry1] : Ms. VIEYRA is an 82 year old woman with Stage IB Endometrioid carcinoma, FIGO grade 1, with LVSI who underwent surgery followed by VBT from 3/29/22 - 4/5/22    \par \par She was last seen in the office in July. At that time, she was feeling  well but with some intermittent groin discomfort that she described as a dull ache and resolves spontaneously. She noted persistent fatigue since completion of radiation without interference of ADLs. Admitted to feeling of upset/weak stomach without emesis. Ankle swelling unchanged without associated tenderness to palpation. Denied fever/chills, SOB, cough, diarrhea/constipation, changes to bowel habits or urination.\par she underwent Ct abd/pelvis on 7/28/22. this was without evidence of recurrence or metastatic disease. Diverticulosis was noted. She saw  Marleny Murcia NP for gyn oncologic f/u on 8/05/2022 and was noted to be JUAN CARLOS. \par \par Today,1/19/23, she is feeling well. She continues to have intermittent groin discomfort unchanged since last visit. She reports this discomfort occurs if she is more active. This subsides without any analgesia. Reports fatigue. Reports urinary frequency. Reports occasional dysuria, unchanged from prior to surgery. Denies hematuria. Reports incomplete emptying. No bowel complaints. Stopped using vaginal dilators. ..

## 2023-02-01 ENCOUNTER — NON-APPOINTMENT (OUTPATIENT)
Age: 83
End: 2023-02-01

## 2023-02-17 ENCOUNTER — APPOINTMENT (OUTPATIENT)
Dept: GYNECOLOGIC ONCOLOGY | Facility: CLINIC | Age: 83
End: 2023-02-17
Payer: MEDICARE

## 2023-02-17 VITALS
DIASTOLIC BLOOD PRESSURE: 81 MMHG | HEIGHT: 61 IN | HEART RATE: 67 BPM | BODY MASS INDEX: 41.35 KG/M2 | WEIGHT: 219 LBS | SYSTOLIC BLOOD PRESSURE: 158 MMHG

## 2023-02-17 PROCEDURE — 99214 OFFICE O/P EST MOD 30 MIN: CPT

## 2023-02-17 NOTE — DISCUSSION/SUMMARY
[FreeTextEntry1] : Signs and symptoms of recurrence reviewed in detail, namely vaginal bleeding, abdominal pain, changes in bowel habits or urination, nausea/vomiting.\par F/u in 6 months or sooner prn\par Plan for imaging as clinically indicated\par No role for vaginal pap smears, would discontinue

## 2023-02-17 NOTE — HISTORY OF PRESENT ILLNESS
[FreeTextEntry1] : IB gr1 endometrial cancer (+LVI)\par robotic TLH/BSO/SLN 2/10/22\par Vaginal brachytherapy x 3 3/29/22-4/5/22\par \par Last seen 8/2022\par \par Doing well no complaints.  Denies abdominal/pelvic pain, dyspnea or chest pain, vaginal bleeding or discharge, nausea/vomiting, changes in bowel habits or urination, lower extremity edema or pain.\par \par She saw Dr. Joy for a gynecologic exam in 9/2022 and pap smear was negative.\par

## 2023-02-28 ENCOUNTER — APPOINTMENT (OUTPATIENT)
Dept: ENDOCRINOLOGY | Facility: CLINIC | Age: 83
End: 2023-02-28
Payer: MEDICARE

## 2023-02-28 VITALS
BODY MASS INDEX: 41.35 KG/M2 | HEIGHT: 61 IN | HEART RATE: 80 BPM | SYSTOLIC BLOOD PRESSURE: 130 MMHG | WEIGHT: 219 LBS | OXYGEN SATURATION: 97 % | DIASTOLIC BLOOD PRESSURE: 70 MMHG

## 2023-02-28 LAB — HBA1C MFR BLD HPLC: 5.2

## 2023-02-28 PROCEDURE — 99214 OFFICE O/P EST MOD 30 MIN: CPT | Mod: 25

## 2023-02-28 PROCEDURE — 95251 CONT GLUC MNTR ANALYSIS I&R: CPT

## 2023-04-18 ENCOUNTER — NON-APPOINTMENT (OUTPATIENT)
Age: 83
End: 2023-04-18

## 2023-06-06 NOTE — PROGRESS NOTE ADULT - PROBLEM SELECTOR PLAN 4
Last Office Visit 4-17-23  Next Office Visit 10-23-23        ----- Message from Kassy Maldonado sent at 6/6/2023 11:06 AM CDT -----  Patient is requesting refills of Fiorcet, Imitrex and Maxalt [facility]. Please notify pt once the meds have been sent.       - hx of endometrial adenocarcinoma, s/p radiation x3, most recently 1 wk ago. Concern for vaginal dryness / candidiasis in setting of recent treatment  - will f/u with OP GYN

## 2023-08-10 ENCOUNTER — APPOINTMENT (OUTPATIENT)
Dept: ENDOCRINOLOGY | Facility: CLINIC | Age: 83
End: 2023-08-10
Payer: MEDICARE

## 2023-08-10 VITALS — BODY MASS INDEX: 43.19 KG/M2 | HEIGHT: 60 IN | WEIGHT: 220 LBS

## 2023-08-10 VITALS — HEART RATE: 74 BPM | DIASTOLIC BLOOD PRESSURE: 80 MMHG | SYSTOLIC BLOOD PRESSURE: 128 MMHG | OXYGEN SATURATION: 96 %

## 2023-08-10 PROCEDURE — ZZZZZ: CPT

## 2023-08-10 PROCEDURE — 82962 GLUCOSE BLOOD TEST: CPT

## 2023-08-10 PROCEDURE — 83036 HEMOGLOBIN GLYCOSYLATED A1C: CPT | Mod: QW

## 2023-08-10 PROCEDURE — 99215 OFFICE O/P EST HI 40 MIN: CPT | Mod: 25

## 2023-08-10 PROCEDURE — 77085 DXA BONE DENSITY AXL VRT FX: CPT | Mod: GA

## 2023-08-11 LAB
GLUCOSE BLDC GLUCOMTR-MCNC: 113
HBA1C MFR BLD HPLC: 6.4

## 2023-08-18 ENCOUNTER — APPOINTMENT (OUTPATIENT)
Dept: GYNECOLOGIC ONCOLOGY | Facility: CLINIC | Age: 83
End: 2023-08-18

## 2023-08-21 ENCOUNTER — NON-APPOINTMENT (OUTPATIENT)
Age: 83
End: 2023-08-21

## 2023-08-21 ENCOUNTER — APPOINTMENT (OUTPATIENT)
Dept: GYNECOLOGIC ONCOLOGY | Facility: CLINIC | Age: 83
End: 2023-08-21
Payer: MEDICARE

## 2023-08-21 VITALS
WEIGHT: 226 LBS | HEART RATE: 81 BPM | HEIGHT: 60 IN | SYSTOLIC BLOOD PRESSURE: 160 MMHG | DIASTOLIC BLOOD PRESSURE: 71 MMHG | BODY MASS INDEX: 44.37 KG/M2

## 2023-08-21 DIAGNOSIS — C54.1 MALIGNANT NEOPLASM OF ENDOMETRIUM: ICD-10-CM

## 2023-08-21 PROCEDURE — 99214 OFFICE O/P EST MOD 30 MIN: CPT

## 2023-08-21 NOTE — HISTORY OF PRESENT ILLNESS
[FreeTextEntry1] : IB gr1 endometrial cancer (+LVI) robotic TLH/BSO/SLN 2/10/22 Vaginal brachytherapy x 3 3/29/22-4/5/22  Last seen 2/2023  Doing well no complaints.  Denies abdominal/pelvic pain, dyspnea or chest pain, vaginal bleeding or discharge, nausea/vomiting, changes in bowel habits or urination, lower extremity edema or pain.  She reports discomfort along medial thighs bilaterally.

## 2023-08-21 NOTE — DISCUSSION/SUMMARY
[FreeTextEntry1] : Signs and symptoms of recurrence reviewed in detail, namely vaginal bleeding, abdominal pain, changes in bowel habits or urination, nausea/vomiting. F/u in 6 months or sooner prn Plan for imaging as clinically indicated

## 2023-09-06 NOTE — ASU PREOP CHECKLIST - TAMPON REMOVED
n/a
Pt sent by pmd for foot infection w + cellulitis, toe ulcer c/w diabetic foot infection.  Pt w sig swelling RLE - suspect 2/2 infection rather than dvt but will also check doppler.  Labs, abx, xray, u/s ordered; pt declined pain meds.  Podiatry consulted.  Plan for admit.   See progress notes for further mdm related documentation.

## 2023-11-10 ENCOUNTER — APPOINTMENT (OUTPATIENT)
Dept: ENDOCRINOLOGY | Facility: CLINIC | Age: 83
End: 2023-11-10
Payer: MEDICARE

## 2023-11-10 VITALS
BODY MASS INDEX: 44.57 KG/M2 | WEIGHT: 227 LBS | DIASTOLIC BLOOD PRESSURE: 70 MMHG | SYSTOLIC BLOOD PRESSURE: 120 MMHG | OXYGEN SATURATION: 96 % | HEART RATE: 80 BPM | HEIGHT: 60 IN

## 2023-11-10 PROCEDURE — 99215 OFFICE O/P EST HI 40 MIN: CPT | Mod: 25

## 2023-11-10 PROCEDURE — 95251 CONT GLUC MNTR ANALYSIS I&R: CPT

## 2023-11-10 PROCEDURE — 83036 HEMOGLOBIN GLYCOSYLATED A1C: CPT | Mod: QW

## 2023-11-10 RX ORDER — INSULIN GLARGINE 100 [IU]/ML
100 INJECTION, SOLUTION SUBCUTANEOUS DAILY
Qty: 15 | Refills: 0 | Status: DISCONTINUED | COMMUNITY
Start: 2022-07-15 | End: 2023-11-10

## 2023-11-10 RX ORDER — INSULIN GLARGINE 100 [IU]/ML
100 INJECTION, SOLUTION SUBCUTANEOUS DAILY
Qty: 8 | Refills: 0 | Status: DISCONTINUED | COMMUNITY
Start: 2022-05-09 | End: 2023-11-10

## 2023-11-13 LAB
25(OH)D3 SERPL-MCNC: 59.7 NG/ML
ALBUMIN SERPL ELPH-MCNC: 4.4 G/DL
ALP BLD-CCNC: 117 U/L
ALT SERPL-CCNC: 23 U/L
ANION GAP SERPL CALC-SCNC: 13 MMOL/L
AST SERPL-CCNC: 25 U/L
BILIRUB SERPL-MCNC: 0.2 MG/DL
BUN SERPL-MCNC: 17 MG/DL
CALCIUM SERPL-MCNC: 9.7 MG/DL
CALCIUM SERPL-MCNC: 9.7 MG/DL
CHLORIDE SERPL-SCNC: 102 MMOL/L
CHOLEST SERPL-MCNC: 109 MG/DL
CO2 SERPL-SCNC: 27 MMOL/L
CREAT SERPL-MCNC: 0.57 MG/DL
CREAT SPEC-SCNC: 96 MG/DL
EGFR: 90 ML/MIN/1.73M2
GLUCOSE SERPL-MCNC: 204 MG/DL
HBA1C MFR BLD HPLC: 7.8
HDLC SERPL-MCNC: 57 MG/DL
LDLC SERPL CALC-MCNC: 29 MG/DL
MICROALBUMIN 24H UR DL<=1MG/L-MCNC: 2.5 MG/DL
MICROALBUMIN/CREAT 24H UR-RTO: 26 MG/G
NONHDLC SERPL-MCNC: 53 MG/DL
PARATHYROID HORMONE INTACT: 48 PG/ML
POTASSIUM SERPL-SCNC: 4.2 MMOL/L
PROT SERPL-MCNC: 7.2 G/DL
SODIUM SERPL-SCNC: 142 MMOL/L
TRIGL SERPL-MCNC: 140 MG/DL
TSH SERPL-ACNC: 1.37 UIU/ML

## 2023-11-16 RX ORDER — TIRZEPATIDE 2.5 MG/.5ML
2.5 INJECTION, SOLUTION SUBCUTANEOUS
Qty: 1 | Refills: 3 | Status: DISCONTINUED | COMMUNITY
Start: 2023-11-10 | End: 2023-11-16

## 2023-12-28 NOTE — DISCHARGE NOTE PROVIDER - NSFTFHOMEHTHYNRD_GEN_ALL_CORE
Problem: At Risk for Falls  Goal: # Patient does not fall  Outcome: Outcome Met, Continue evaluating goal progress toward completion  Goal: # Takes action to control fall-related risks  Outcome: Outcome Met, Continue evaluating goal progress toward completion  Goal: # Verbalizes understanding of fall risk/precautions  Description: Document education using the patient education activity  Outcome: Outcome Met, Continue evaluating goal progress toward completion     Problem: At Risk for Injury Due to Fall  Goal: # Patient does not fall  Outcome: Outcome Met, Continue evaluating goal progress toward completion  Goal: # Takes action to control condition specific risks  Outcome: Outcome Met, Continue evaluating goal progress toward completion  Goal: # Verbalizes understanding of fall-related injury personal risks  Description: Document education using the patient education activity  Outcome: Outcome Met, Continue evaluating goal progress toward completion     Problem: Pressure Injury, Risk for  Goal: # Skin remains intact  Outcome: Outcome Met, Continue evaluating goal progress toward completion  Goal: No new pressure injury (PI) development  Outcome: Outcome Met, Continue evaluating goal progress toward completion  Goal: # Verbalizes understanding of PI risk factors and prevention strategies  Description: Document education using the patient education activity.   Outcome: Outcome Met, Continue evaluating goal progress toward completion  Goal: Comfort optimized with pressure injury prevention strategies guided by patient/family preference. (Hospice)  Outcome: Outcome Met, Continue evaluating goal progress toward completion     Problem: Pressure Injury Actual  Goal: # No deterioration in pressure injury (PI)  Outcome: Outcome Met, Continue evaluating goal progress toward completion  Goal: # Verbalizes pressure injury management  Description: Document education using the patient education activity.  Outcome: Outcome Met,  Continue evaluating goal progress toward completion  Goal: Wound care provided to promote comfort needs (Hospice)  Outcome: Outcome Met, Continue evaluating goal progress toward completion     Problem: Non-Pressure Injury Wound  Goal: # No deterioration in wound  Outcome: Outcome Met, Continue evaluating goal progress toward completion  Goal: # Verbalizes understanding of wound and wound care  Description: If abnormality is a skin tear, avoid using tape on skin including transparent dressings. Document education using the patient education activity.  Outcome: Outcome Met, Continue evaluating goal progress toward completion  Goal: Participates in wound care activities  Outcome: Outcome Met, Continue evaluating goal progress toward completion  Goal: Wound care provided to promote comfort needs (Hospice)  Outcome: Outcome Met, Continue evaluating goal progress toward completion     Problem: Cellulitis  Goal: Cellulitis improved as evidenced by decreased redness, swelling and pain  Description: Girth measurement may be used to evaluate edema.  Outcome: Outcome Met, Continue evaluating goal progress toward completion  Goal: Care provided to promote comfort needs (Hospice)  Outcome: Outcome Met, Continue evaluating goal progress toward completion     Problem: Activity Intolerance  Goal: # Functional status is maintained or returned to baseline  Outcome: Outcome Met, Continue evaluating goal progress toward completion  Goal: # Tolerates activity for d/c setting with no clinical problems  Outcome: Outcome Met, Continue evaluating goal progress toward completion     Problem: Fluid Volume Excess, Risk for  Goal: # Absence of Rapid Weight Gain (no more than 2kg in 24 hours)  Description: FVE Risk Patients may gain weight (but not more than 2 kg) but may not require aggressive treatment if in the absence of dyspnea; FVE (actual) patients should be monitored to achieve no weight gain.   Yes

## 2024-01-28 ENCOUNTER — NON-APPOINTMENT (OUTPATIENT)
Age: 84
End: 2024-01-28

## 2024-02-09 PROBLEM — C54.1 ENDOMETRIAL CANCER: Status: ACTIVE | Noted: 2022-01-10

## 2024-02-16 ENCOUNTER — APPOINTMENT (OUTPATIENT)
Dept: GYNECOLOGIC ONCOLOGY | Facility: CLINIC | Age: 84
End: 2024-02-16
Payer: MEDICARE

## 2024-02-16 VITALS
DIASTOLIC BLOOD PRESSURE: 84 MMHG | HEART RATE: 84 BPM | WEIGHT: 222 LBS | BODY MASS INDEX: 43.59 KG/M2 | HEIGHT: 60 IN | SYSTOLIC BLOOD PRESSURE: 157 MMHG

## 2024-02-16 DIAGNOSIS — C54.1 MALIGNANT NEOPLASM OF ENDOMETRIUM: ICD-10-CM

## 2024-02-16 PROCEDURE — 99213 OFFICE O/P EST LOW 20 MIN: CPT

## 2024-02-16 NOTE — HISTORY OF PRESENT ILLNESS
[FreeTextEntry1] : IB gr1 endometrial cancer (+LVI) robotic TLH/BSO/SLN 2/10/22 Vaginal brachytherapy x 3 3/29/22-4/5/22  Last seen 8/2023  Doing well no complaints.  Denies abdominal/pelvic pain, dyspnea or chest pain, vaginal bleeding or discharge, nausea/vomiting, changes in bowel habits or urination, lower extremity edema or pain.

## 2024-02-29 ENCOUNTER — APPOINTMENT (OUTPATIENT)
Dept: ENDOCRINOLOGY | Facility: CLINIC | Age: 84
End: 2024-02-29

## 2024-03-01 ENCOUNTER — APPOINTMENT (OUTPATIENT)
Dept: ENDOCRINOLOGY | Facility: CLINIC | Age: 84
End: 2024-03-01
Payer: MEDICARE

## 2024-03-01 VITALS
OXYGEN SATURATION: 96 % | DIASTOLIC BLOOD PRESSURE: 70 MMHG | SYSTOLIC BLOOD PRESSURE: 140 MMHG | HEART RATE: 84 BPM | BODY MASS INDEX: 43.98 KG/M2 | WEIGHT: 224 LBS | HEIGHT: 60 IN

## 2024-03-01 DIAGNOSIS — E78.5 HYPERLIPIDEMIA, UNSPECIFIED: ICD-10-CM

## 2024-03-01 DIAGNOSIS — M81.0 AGE-RELATED OSTEOPOROSIS W/OUT CURRENT PATHOLOGICAL FRACTURE: ICD-10-CM

## 2024-03-01 DIAGNOSIS — E11.9 TYPE 2 DIABETES MELLITUS W/OUT COMPLICATIONS: ICD-10-CM

## 2024-03-01 DIAGNOSIS — I10 ESSENTIAL (PRIMARY) HYPERTENSION: ICD-10-CM

## 2024-03-01 PROCEDURE — G2211 COMPLEX E/M VISIT ADD ON: CPT

## 2024-03-01 PROCEDURE — 83036 HEMOGLOBIN GLYCOSYLATED A1C: CPT | Mod: QW

## 2024-03-01 PROCEDURE — 99214 OFFICE O/P EST MOD 30 MIN: CPT

## 2024-03-01 PROCEDURE — 95251 CONT GLUC MNTR ANALYSIS I&R: CPT

## 2024-03-01 RX ORDER — ALENDRONATE SODIUM 70 MG/1
70 TABLET ORAL
Qty: 12 | Refills: 3 | Status: ACTIVE | COMMUNITY
Start: 2023-11-13 | End: 1900-01-01

## 2024-03-01 RX ORDER — SEMAGLUTIDE 2.68 MG/ML
8 INJECTION, SOLUTION SUBCUTANEOUS
Qty: 3 | Refills: 2 | Status: ACTIVE | COMMUNITY
Start: 2023-11-16 | End: 1900-01-01

## 2024-03-04 LAB — HBA1C MFR BLD HPLC: 6.3

## 2024-07-12 ENCOUNTER — APPOINTMENT (OUTPATIENT)
Dept: ENDOCRINOLOGY | Facility: CLINIC | Age: 84
End: 2024-07-12

## 2024-07-12 ENCOUNTER — APPOINTMENT (OUTPATIENT)
Dept: ENDOCRINOLOGY | Facility: CLINIC | Age: 84
End: 2024-07-12
Payer: MEDICARE

## 2024-07-12 VITALS
BODY MASS INDEX: 43.19 KG/M2 | HEIGHT: 60 IN | SYSTOLIC BLOOD PRESSURE: 142 MMHG | DIASTOLIC BLOOD PRESSURE: 72 MMHG | HEART RATE: 80 BPM | OXYGEN SATURATION: 96 % | WEIGHT: 220 LBS

## 2024-07-12 DIAGNOSIS — M81.0 AGE-RELATED OSTEOPOROSIS W/OUT CURRENT PATHOLOGICAL FRACTURE: ICD-10-CM

## 2024-07-12 DIAGNOSIS — E11.9 TYPE 2 DIABETES MELLITUS W/OUT COMPLICATIONS: ICD-10-CM

## 2024-07-12 DIAGNOSIS — I10 ESSENTIAL (PRIMARY) HYPERTENSION: ICD-10-CM

## 2024-07-12 DIAGNOSIS — E78.5 HYPERLIPIDEMIA, UNSPECIFIED: ICD-10-CM

## 2024-07-12 PROCEDURE — 83036 HEMOGLOBIN GLYCOSYLATED A1C: CPT | Mod: QW

## 2024-07-12 PROCEDURE — 99214 OFFICE O/P EST MOD 30 MIN: CPT

## 2024-07-12 RX ORDER — BLOOD SUGAR DIAGNOSTIC
STRIP MISCELLANEOUS
Qty: 3 | Refills: 3 | Status: ACTIVE | COMMUNITY
Start: 2024-07-12 | End: 1900-01-01

## 2024-07-15 LAB — HBA1C MFR BLD HPLC: 5.6

## 2024-07-15 RX ORDER — BLOOD-GLUCOSE SENSOR
EACH MISCELLANEOUS
Qty: 6 | Refills: 3 | Status: ACTIVE | COMMUNITY
Start: 2024-07-12 | End: 1900-01-01

## 2024-07-26 NOTE — HISTORY OF PRESENT ILLNESS
[FreeTextEntry1] : IB gr1 endometrial cancer (+LVI) robotic TLH/BSO/SLN 2/10/22 Vaginal brachytherapy x 3 3/29/22-4/5/22  Last seen 2/2024  Doing well no complaints.  Denies abdominal/pelvic pain, dyspnea or chest pain, vaginal bleeding or discharge, nausea/vomiting, changes in bowel habits or urination, lower extremity edema or pain.

## 2024-08-16 ENCOUNTER — APPOINTMENT (OUTPATIENT)
Dept: GYNECOLOGIC ONCOLOGY | Facility: CLINIC | Age: 84
End: 2024-08-16

## 2024-08-16 DIAGNOSIS — C54.1 MALIGNANT NEOPLASM OF ENDOMETRIUM: ICD-10-CM

## 2024-12-06 ENCOUNTER — NON-APPOINTMENT (OUTPATIENT)
Age: 84
End: 2024-12-06

## 2024-12-06 ENCOUNTER — APPOINTMENT (OUTPATIENT)
Dept: ENDOCRINOLOGY | Facility: CLINIC | Age: 84
End: 2024-12-06
Payer: MEDICARE

## 2024-12-06 VITALS
SYSTOLIC BLOOD PRESSURE: 124 MMHG | DIASTOLIC BLOOD PRESSURE: 76 MMHG | WEIGHT: 220 LBS | OXYGEN SATURATION: 96 % | HEART RATE: 78 BPM | HEIGHT: 60 IN | BODY MASS INDEX: 43.19 KG/M2

## 2024-12-06 DIAGNOSIS — E11.9 TYPE 2 DIABETES MELLITUS W/OUT COMPLICATIONS: ICD-10-CM

## 2024-12-06 DIAGNOSIS — M81.0 AGE-RELATED OSTEOPOROSIS W/OUT CURRENT PATHOLOGICAL FRACTURE: ICD-10-CM

## 2024-12-06 DIAGNOSIS — I10 ESSENTIAL (PRIMARY) HYPERTENSION: ICD-10-CM

## 2024-12-06 DIAGNOSIS — E78.5 HYPERLIPIDEMIA, UNSPECIFIED: ICD-10-CM

## 2024-12-06 PROCEDURE — 99214 OFFICE O/P EST MOD 30 MIN: CPT

## 2024-12-06 PROCEDURE — 83036 HEMOGLOBIN GLYCOSYLATED A1C: CPT | Mod: QW

## 2024-12-06 PROCEDURE — G2211 COMPLEX E/M VISIT ADD ON: CPT

## 2024-12-06 RX ORDER — TIRZEPATIDE 7.5 MG/.5ML
7.5 INJECTION, SOLUTION SUBCUTANEOUS
Qty: 1 | Refills: 0 | Status: ACTIVE | COMMUNITY
Start: 2024-12-06 | End: 1900-01-01

## 2024-12-06 RX ORDER — BLOOD-GLUCOSE,RECEIVER,CONT
EACH MISCELLANEOUS
Qty: 1 | Refills: 1 | Status: ACTIVE | COMMUNITY
Start: 2024-12-06 | End: 1900-01-01

## 2024-12-06 RX ORDER — ALCOHOL ANTISEPTIC PADS
PADS, MEDICATED (EA) TOPICAL
Qty: 1 | Refills: 2 | Status: ACTIVE | COMMUNITY
Start: 2024-12-06 | End: 1900-01-01

## 2024-12-09 LAB
25(OH)D3 SERPL-MCNC: 59.2 NG/ML
ALBUMIN SERPL ELPH-MCNC: 4.5 G/DL
ALP BLD-CCNC: 109 U/L
ALT SERPL-CCNC: 21 U/L
ANION GAP SERPL CALC-SCNC: 15 MMOL/L
AST SERPL-CCNC: 25 U/L
BILIRUB SERPL-MCNC: 0.2 MG/DL
BUN SERPL-MCNC: 20 MG/DL
CALCIUM SERPL-MCNC: 10.6 MG/DL
CHLORIDE SERPL-SCNC: 100 MMOL/L
CHOLEST SERPL-MCNC: 127 MG/DL
CO2 SERPL-SCNC: 27 MMOL/L
CREAT SERPL-MCNC: 0.71 MG/DL
CREAT SPEC-SCNC: 66 MG/DL
EGFR: 83 ML/MIN/1.73M2
GLUCOSE SERPL-MCNC: 118 MG/DL
HBA1C MFR BLD HPLC: 6
HDLC SERPL-MCNC: 64 MG/DL
LDLC SERPL CALC-MCNC: 41 MG/DL
MICROALBUMIN 24H UR DL<=1MG/L-MCNC: <1.2 MG/DL
MICROALBUMIN/CREAT 24H UR-RTO: NORMAL MG/G
NONHDLC SERPL-MCNC: 63 MG/DL
POTASSIUM SERPL-SCNC: 4.6 MMOL/L
PROT SERPL-MCNC: 7.7 G/DL
SODIUM SERPL-SCNC: 141 MMOL/L
TRIGL SERPL-MCNC: 124 MG/DL

## 2025-02-14 ENCOUNTER — RX RENEWAL (OUTPATIENT)
Age: 85
End: 2025-02-14

## 2025-03-10 ENCOUNTER — NON-APPOINTMENT (OUTPATIENT)
Age: 85
End: 2025-03-10

## 2025-03-13 RX ORDER — TIRZEPATIDE 10 MG/.5ML
10 INJECTION, SOLUTION SUBCUTANEOUS
Qty: 1 | Refills: 2 | Status: ACTIVE | COMMUNITY
Start: 2025-03-10

## 2025-03-14 ENCOUNTER — APPOINTMENT (OUTPATIENT)
Dept: ENDOCRINOLOGY | Facility: CLINIC | Age: 85
End: 2025-03-14

## 2025-03-24 ENCOUNTER — APPOINTMENT (OUTPATIENT)
Dept: ENDOCRINOLOGY | Facility: CLINIC | Age: 85
End: 2025-03-24
Payer: MEDICARE

## 2025-03-24 DIAGNOSIS — M81.0 AGE-RELATED OSTEOPOROSIS W/OUT CURRENT PATHOLOGICAL FRACTURE: ICD-10-CM

## 2025-03-24 DIAGNOSIS — I10 ESSENTIAL (PRIMARY) HYPERTENSION: ICD-10-CM

## 2025-03-24 DIAGNOSIS — E11.9 TYPE 2 DIABETES MELLITUS W/OUT COMPLICATIONS: ICD-10-CM

## 2025-03-24 DIAGNOSIS — E78.5 HYPERLIPIDEMIA, UNSPECIFIED: ICD-10-CM

## 2025-03-24 PROCEDURE — 99214 OFFICE O/P EST MOD 30 MIN: CPT | Mod: 2W

## 2025-03-24 PROCEDURE — G2211 COMPLEX E/M VISIT ADD ON: CPT | Mod: 2W

## 2025-05-13 ENCOUNTER — RX RENEWAL (OUTPATIENT)
Age: 85
End: 2025-05-13

## 2025-06-21 ENCOUNTER — NON-APPOINTMENT (OUTPATIENT)
Age: 85
End: 2025-06-21

## 2025-06-23 ENCOUNTER — APPOINTMENT (OUTPATIENT)
Dept: ENDOCRINOLOGY | Facility: CLINIC | Age: 85
End: 2025-06-23
Payer: MEDICARE

## 2025-06-23 PROCEDURE — G2211 COMPLEX E/M VISIT ADD ON: CPT | Mod: 2W

## 2025-06-23 PROCEDURE — 99214 OFFICE O/P EST MOD 30 MIN: CPT | Mod: 2W

## 2025-07-24 ENCOUNTER — APPOINTMENT (OUTPATIENT)
Dept: ENDOCRINOLOGY | Facility: CLINIC | Age: 85
End: 2025-07-24

## (undated) DEVICE — XI NEEDLE DRIVER LARGE

## (undated) DEVICE — FOLEY TRAY 16FR 5CC LF UMETER CLOSED

## (undated) DEVICE — UTERINE MANIPULATOR CONMED VCARE SM 32MM

## (undated) DEVICE — XI SEAL UNIV 5- 8 MM

## (undated) DEVICE — PROTECTOR HEEL / ELBOW

## (undated) DEVICE — LUBRICATING JELLY ONESHOT 1.25OZ

## (undated) DEVICE — XI DRAPE ARM

## (undated) DEVICE — SUT VICRYL 0 27" UR-6

## (undated) DEVICE — XI ARM PERMANENT CAUTERY SPATULA

## (undated) DEVICE — SUT VLOC 180 3-0 9" V-20 GREEN

## (undated) DEVICE — TROCAR SURGIQUEST AIRSEAL 8MMX100MM

## (undated) DEVICE — GLV 5.5 PROTEXIS

## (undated) DEVICE — XI ARM FORCEP MARYLAND BIPOLAR

## (undated) DEVICE — VENODYNE/SCD SLEEVE CALF MEDIUM

## (undated) DEVICE — POSITIONER PURPLE ARM ONE STEP (LARGE)

## (undated) DEVICE — XI ARM FORCEP FENESTRATED BIPOLAR 8MM

## (undated) DEVICE — GLV 6 PROTEXIS (WHITE)

## (undated) DEVICE — XI OBTURATOR OPTICAL BLADELESS 8MM

## (undated) DEVICE — SYR LUER LOK 10CC

## (undated) DEVICE — XI ARM GRASPER TIP UP FENESTRATED

## (undated) DEVICE — XI ARM FORCEP PROGRASP 8MM

## (undated) DEVICE — DRSG OPSITE 13.75 X 4"

## (undated) DEVICE — XI TIP COVER

## (undated) DEVICE — PACK PERI GYN

## (undated) DEVICE — POSITIONER PINK PAD PIGAZZI SYSTEM

## (undated) DEVICE — UTERINE MANIPULATOR MPM MEDICAL ZUMI 4.5MM

## (undated) DEVICE — ELCTR GROUNDING PAD ADULT COVIDIEN

## (undated) DEVICE — UTERINE MANIPULATOR CONMED VCARE MED 34MM

## (undated) DEVICE — PACK ROBOTIC LIJ

## (undated) DEVICE — TUBING STRYKEFLOW II SUCTION / IRRIGATOR

## (undated) DEVICE — ELCTR BOVIE TIP BLADE INSULATED 2.75" EDGE

## (undated) DEVICE — GOWN XXXL

## (undated) DEVICE — GOWN XL

## (undated) DEVICE — XI DRAPE COLUMN

## (undated) DEVICE — XI ARM SCISSOR MONO CURVED

## (undated) DEVICE — TUBING AIRSEAL TRI-LUMEN FILTERED

## (undated) DEVICE — DRSG STERISTRIPS 0.5 X 4"

## (undated) DEVICE — XI ARM FORCEP TENACULUM

## (undated) DEVICE — UTERINE MANIPULATOR CONMED VCARE LG 37MM

## (undated) DEVICE — XI ARM PERMANENT CAUTERY HOOK

## (undated) DEVICE — D HELP - CLEARVIEW CLEARIFY SYSTEM

## (undated) DEVICE — SUT MONOCRYL 4-0 27" PS-2 UNDYED

## (undated) DEVICE — ENDOCATCH 10MM SPECIMEN POUCH

## (undated) DEVICE — SUT VICRYL 0 27" CT-2 UNDYED

## (undated) DEVICE — POSITIONER STRAP ARMBOARD VELCRO TS-30

## (undated) DEVICE — POSITIONER FOAM HEAD CRADLE (PINK)